# Patient Record
Sex: FEMALE | Race: WHITE | Employment: OTHER | URBAN - METROPOLITAN AREA
[De-identification: names, ages, dates, MRNs, and addresses within clinical notes are randomized per-mention and may not be internally consistent; named-entity substitution may affect disease eponyms.]

---

## 2017-03-15 ENCOUNTER — HOSPITAL ENCOUNTER (OUTPATIENT)
Age: 82
Discharge: HOME | End: 2017-03-15
Attending: FAMILY MEDICINE
Payer: MEDICARE

## 2017-03-15 VITALS
WEIGHT: 137 LBS | RESPIRATION RATE: 14 BRPM | OXYGEN SATURATION: 99 % | SYSTOLIC BLOOD PRESSURE: 140 MMHG | DIASTOLIC BLOOD PRESSURE: 70 MMHG | HEART RATE: 80 BPM | TEMPERATURE: 99 F

## 2017-03-15 DIAGNOSIS — R04.0 EPISTAXIS: Primary | ICD-10-CM

## 2017-03-15 PROCEDURE — 99212 OFFICE O/P EST SF 10 MIN: CPT

## 2017-03-15 RX ORDER — ASPIRIN 81 MG/1
81 TABLET ORAL DAILY
Status: ON HOLD | COMMUNITY
End: 2021-04-30

## 2017-03-15 NOTE — ED PROVIDER NOTES
Patient Seen in: [facility]    History   Patient presents with:  Cough/URI    Stated Complaint: NOSE PAIN    The history is provided by the patient.        Patient is an 80 y.o who presents with a 4-5 day h/o of nosebleeds She is oriented to person, place, and time. She appears well-developed and well-nourished.    HENT:   Right Ear: Tympanic membrane, external ear and ear canal normal.   Left Ear: Tympanic membrane, external ear and ear canal normal.   Nose: Rhinorrhea prese

## 2017-03-15 NOTE — ED INITIAL ASSESSMENT (HPI)
Sick for 4 days with nasal congestion. Frequent nosebleeds. No fever. No cough. Not sleeping well at night.

## 2017-07-27 ENCOUNTER — APPOINTMENT (OUTPATIENT)
Dept: GENERAL RADIOLOGY | Facility: HOSPITAL | Age: 82
End: 2017-07-27
Payer: MEDICARE

## 2017-07-27 ENCOUNTER — HOSPITAL ENCOUNTER (OUTPATIENT)
Facility: HOSPITAL | Age: 82
Setting detail: OBSERVATION
Discharge: HOME | End: 2017-07-27
Attending: EMERGENCY MEDICINE
Payer: MEDICARE

## 2017-07-27 ENCOUNTER — APPOINTMENT (OUTPATIENT)
Dept: INTERVENTIONAL RADIOLOGY/VASCULAR | Facility: HOSPITAL | Age: 82
End: 2017-07-27
Attending: INTERNAL MEDICINE
Payer: MEDICARE

## 2017-07-27 VITALS
BODY MASS INDEX: 26.87 KG/M2 | HEIGHT: 60 IN | SYSTOLIC BLOOD PRESSURE: 115 MMHG | TEMPERATURE: 97 F | HEART RATE: 63 BPM | WEIGHT: 136.88 LBS | RESPIRATION RATE: 18 BRPM | DIASTOLIC BLOOD PRESSURE: 49 MMHG | OXYGEN SATURATION: 94 %

## 2017-07-27 DIAGNOSIS — I21.3 ST ELEVATION MYOCARDIAL INFARCTION (STEMI), UNSPECIFIED ARTERY (HCC): Primary | ICD-10-CM

## 2017-07-27 LAB
ANION GAP SERPL CALC-SCNC: 6 MMOL/L (ref 0–18)
ANION GAP SERPL CALC-SCNC: 6 MMOL/L (ref 0–18)
APTT PPP: 25.9 SECONDS (ref 23.2–35.3)
APTT PPP: 26.3 SECONDS (ref 23.2–35.3)
BASOPHILS # BLD: 0.1 K/UL (ref 0–0.2)
BASOPHILS NFR BLD: 1 %
BUN SERPL-MCNC: 22 MG/DL (ref 8–20)
BUN SERPL-MCNC: 24 MG/DL (ref 8–20)
BUN/CREAT SERPL: 21.2 (ref 10–20)
BUN/CREAT SERPL: 21.8 (ref 10–20)
CALCIUM SERPL-MCNC: 8.4 MG/DL (ref 8.5–10.5)
CALCIUM SERPL-MCNC: 9 MG/DL (ref 8.5–10.5)
CHLORIDE SERPL-SCNC: 106 MMOL/L (ref 95–110)
CHLORIDE SERPL-SCNC: 108 MMOL/L (ref 95–110)
CO2 SERPL-SCNC: 24 MMOL/L (ref 22–32)
CO2 SERPL-SCNC: 24 MMOL/L (ref 22–32)
CREAT SERPL-MCNC: 1.04 MG/DL (ref 0.5–1.5)
CREAT SERPL-MCNC: 1.1 MG/DL (ref 0.5–1.5)
EOSINOPHIL # BLD: 0.4 K/UL (ref 0–0.7)
EOSINOPHIL NFR BLD: 4 %
ERYTHROCYTE [DISTWIDTH] IN BLOOD BY AUTOMATED COUNT: 17.2 % (ref 11–15)
ERYTHROCYTE [DISTWIDTH] IN BLOOD BY AUTOMATED COUNT: 17.2 % (ref 11–15)
GLUCOSE SERPL-MCNC: 119 MG/DL (ref 70–99)
GLUCOSE SERPL-MCNC: 99 MG/DL (ref 70–99)
HCT VFR BLD AUTO: 28.2 % (ref 35–48)
HCT VFR BLD AUTO: 31.1 % (ref 35–48)
HGB BLD-MCNC: 8.5 G/DL (ref 12–16)
HGB BLD-MCNC: 9.6 G/DL (ref 12–16)
LYMPHOCYTES # BLD: 2.2 K/UL (ref 1–4)
LYMPHOCYTES NFR BLD: 27 %
MCH RBC QN AUTO: 22 PG (ref 27–32)
MCH RBC QN AUTO: 22.4 PG (ref 27–32)
MCHC RBC AUTO-ENTMCNC: 30.3 G/DL (ref 32–37)
MCHC RBC AUTO-ENTMCNC: 30.9 G/DL (ref 32–37)
MCV RBC AUTO: 72.6 FL (ref 80–100)
MCV RBC AUTO: 72.6 FL (ref 80–100)
MONOCYTES # BLD: 0.7 K/UL (ref 0–1)
MONOCYTES NFR BLD: 9 %
NEUTROPHILS # BLD AUTO: 4.8 K/UL (ref 1.8–7.7)
NEUTROPHILS NFR BLD: 60 %
OSMOLALITY UR CALC.SUM OF ELEC: 285 MOSM/KG (ref 275–295)
OSMOLALITY UR CALC.SUM OF ELEC: 291 MOSM/KG (ref 275–295)
PLATELET # BLD AUTO: 247 K/UL (ref 140–400)
PLATELET # BLD AUTO: 290 K/UL (ref 140–400)
PMV BLD AUTO: 8.3 FL (ref 7.4–10.3)
PMV BLD AUTO: 8.5 FL (ref 7.4–10.3)
POTASSIUM SERPL-SCNC: 3.9 MMOL/L (ref 3.3–5.1)
POTASSIUM SERPL-SCNC: 4.1 MMOL/L (ref 3.3–5.1)
RBC # BLD AUTO: 3.88 M/UL (ref 3.7–5.4)
RBC # BLD AUTO: 4.28 M/UL (ref 3.7–5.4)
SODIUM SERPL-SCNC: 136 MMOL/L (ref 136–144)
SODIUM SERPL-SCNC: 138 MMOL/L (ref 136–144)
TROPONIN I SERPL-MCNC: 0.01 NG/ML (ref ?–0.03)
WBC # BLD AUTO: 7.5 K/UL (ref 4–11)
WBC # BLD AUTO: 8.1 K/UL (ref 4–11)

## 2017-07-27 PROCEDURE — B2151ZZ FLUOROSCOPY OF LEFT HEART USING LOW OSMOLAR CONTRAST: ICD-10-PCS | Performed by: INTERNAL MEDICINE

## 2017-07-27 PROCEDURE — B2111ZZ FLUOROSCOPY OF MULTIPLE CORONARY ARTERIES USING LOW OSMOLAR CONTRAST: ICD-10-PCS | Performed by: INTERNAL MEDICINE

## 2017-07-27 PROCEDURE — 71010 XR CHEST AP PORTABLE  (CPT=71010): CPT | Performed by: EMERGENCY MEDICINE

## 2017-07-27 PROCEDURE — 99236 HOSP IP/OBS SAME DATE HI 85: CPT | Performed by: HOSPITALIST

## 2017-07-27 PROCEDURE — 4A023N7 MEASUREMENT OF CARDIAC SAMPLING AND PRESSURE, LEFT HEART, PERCUTANEOUS APPROACH: ICD-10-PCS | Performed by: INTERNAL MEDICINE

## 2017-07-27 RX ORDER — SODIUM CHLORIDE 9 MG/ML
INJECTION, SOLUTION INTRAVENOUS CONTINUOUS
Status: DISCONTINUED | OUTPATIENT
Start: 2017-07-27 | End: 2017-07-27

## 2017-07-27 RX ORDER — HEPARIN SODIUM 1000 [USP'U]/ML
60 INJECTION, SOLUTION INTRAVENOUS; SUBCUTANEOUS ONCE
Status: COMPLETED | OUTPATIENT
Start: 2017-07-27 | End: 2017-07-27

## 2017-07-27 RX ORDER — METOPROLOL SUCCINATE 25 MG/1
25 TABLET, EXTENDED RELEASE ORAL DAILY
Status: DISCONTINUED | OUTPATIENT
Start: 2017-07-27 | End: 2017-07-27

## 2017-07-27 RX ORDER — LISINOPRIL 40 MG/1
40 TABLET ORAL DAILY
Status: DISCONTINUED | OUTPATIENT
Start: 2017-07-27 | End: 2017-07-27

## 2017-07-27 RX ORDER — LISINOPRIL 10 MG/1
5 TABLET ORAL DAILY
Status: DISCONTINUED | OUTPATIENT
Start: 2017-07-27 | End: 2017-07-27

## 2017-07-27 RX ORDER — PANTOPRAZOLE SODIUM 40 MG/1
40 TABLET, DELAYED RELEASE ORAL
Status: DISCONTINUED | OUTPATIENT
Start: 2017-07-27 | End: 2017-07-27

## 2017-07-27 RX ORDER — METOPROLOL SUCCINATE 25 MG/1
25 TABLET, EXTENDED RELEASE ORAL DAILY
COMMUNITY

## 2017-07-27 RX ORDER — MIDAZOLAM HYDROCHLORIDE 1 MG/ML
INJECTION INTRAMUSCULAR; INTRAVENOUS
Status: COMPLETED
Start: 2017-07-27 | End: 2017-07-27

## 2017-07-27 RX ORDER — LIDOCAINE HYDROCHLORIDE 20 MG/ML
INJECTION, SOLUTION EPIDURAL; INFILTRATION; INTRACAUDAL; PERINEURAL
Status: COMPLETED
Start: 2017-07-27 | End: 2017-07-27

## 2017-07-27 RX ORDER — UBIDECARENONE 75 MG
100 CAPSULE ORAL DAILY
Status: DISCONTINUED | OUTPATIENT
Start: 2017-07-27 | End: 2017-07-27

## 2017-07-27 RX ORDER — ATORVASTATIN CALCIUM 40 MG/1
40 TABLET, FILM COATED ORAL DAILY
COMMUNITY

## 2017-07-27 RX ORDER — SODIUM CHLORIDE 9 MG/ML
INJECTION, SOLUTION INTRAVENOUS
Status: COMPLETED
Start: 2017-07-27 | End: 2017-07-27

## 2017-07-27 RX ORDER — PANTOPRAZOLE SODIUM 40 MG/1
40 TABLET, DELAYED RELEASE ORAL
Qty: 30 TABLET | Refills: 0 | Status: ON HOLD | OUTPATIENT
Start: 2017-07-27 | End: 2019-02-27

## 2017-07-27 RX ORDER — CLOPIDOGREL BISULFATE 75 MG/1
75 TABLET ORAL DAILY
Status: DISCONTINUED | OUTPATIENT
Start: 2017-07-28 | End: 2017-07-27

## 2017-07-27 RX ORDER — HEPARIN SODIUM AND DEXTROSE 10000; 5 [USP'U]/100ML; G/100ML
12 INJECTION INTRAVENOUS ONCE
Status: COMPLETED | OUTPATIENT
Start: 2017-07-27 | End: 2017-07-27

## 2017-07-27 RX ORDER — CLOPIDOGREL BISULFATE 75 MG/1
75 TABLET ORAL DAILY
Status: DISCONTINUED | OUTPATIENT
Start: 2017-07-27 | End: 2017-07-27

## 2017-07-27 RX ORDER — ATORVASTATIN CALCIUM 40 MG/1
40 TABLET, FILM COATED ORAL NIGHTLY
Status: DISCONTINUED | OUTPATIENT
Start: 2017-07-27 | End: 2017-07-27

## 2017-07-27 RX ORDER — ASPIRIN 81 MG/1
81 TABLET ORAL DAILY
Status: DISCONTINUED | OUTPATIENT
Start: 2017-07-28 | End: 2017-07-27

## 2017-07-27 RX ORDER — ASPIRIN 81 MG/1
81 TABLET ORAL DAILY
Status: DISCONTINUED | OUTPATIENT
Start: 2017-07-27 | End: 2017-07-27

## 2017-07-27 RX ORDER — HEPARIN SODIUM AND DEXTROSE 10000; 5 [USP'U]/100ML; G/100ML
INJECTION INTRAVENOUS CONTINUOUS
Status: DISCONTINUED | OUTPATIENT
Start: 2017-07-27 | End: 2017-07-27

## 2017-07-27 NOTE — PLAN OF CARE
Problem: CARDIOVASCULAR - ADULT  Goal: Maintains optimal cardiac output and hemodynamic stability  INTERVENTIONS:  - Monitor vital signs, rhythm, and trends  - Monitor for bleeding, hypotension and signs of decreased cardiac output  - Evaluate effectivenes or patient's stated pain goal  INTERVENTIONS:  - Encourage pt to monitor pain and request assistance  - Assess pain using appropriate pain scale  - Administer analgesics based on type and severity of pain and evaluate response  - Implement non-pharmacologi

## 2017-07-27 NOTE — DISCHARGE SUMMARY
[facility]    Discharge Summary    Chino Daly Patient Status:  Observation    1933 MRN [id number]   Location [facility] 2W/SW Attending Lisa Burks MD   Hosp Day # 0 PCP Enrique Kay MD     Date of Admission: / Discharge Condition: Stable    Current Discharge Medication List    New Orders    Pantoprazole Sodium 40 MG Oral Tab EC  Take 1 tablet (40 mg total) by mouth every morning before breakfast.      Home Meds - Unchanged    Metoprolol Succinate ER 25 MG Oral rule out gastritis and evaluate iron deficiency anemia   Contact information:  [address]  [phone]                   Follow up Labs and imaging:   None       Time spent:  > 30 minutes    Nolan Murcia  7/27/2017

## 2017-07-27 NOTE — CONSULTS
[facility] AND [facility]  Report of Consultation    Dina Kowalski Patient Status:  Emergency    1933 MRN [id number]   Location [facility] Attending Alaina att. providers found   [address] Day # 0 PCP Ridge Foley MD     Reason for BS-present. Extremities: Without clubbing, cyanosis or edema. Peripheral pulses are 2+. Neurologic: Alert and oriented, normal affect. No motor or coordinational deficit. Skin: Warm and dry.        Laboratory Data:    Lab Results  Component Value Date

## 2017-07-27 NOTE — CONSULTS
LHC, LV, C done, no complications  LAD- Patent stent.  80% small septal , 80% small D2 in stented segment  Circ- 40-50% mid stenosis, 50% second OM  RCA- mild plaques  LVEF 53%    Rec- Check repeat troponin in am  Pantoprazole  Consider GI etiolog

## 2017-07-27 NOTE — H&P
[address] Patient Status:  Observation    1933 MRN [id number]   Location [facility] 2W/SW Attending Halina Burgos MD   Hosp Day # 0 PCP Everton Calero MD     Date:  2017  Date negative  Genitourinary:negative  Musculoskeletal:negative  Neurological: negative  Behavioral/Psych: negative  Endocrine: negative    Physical Exam:   Vital Signs:  Blood pressure 115/49, pulse 63, temperature (!) 97.4 °F (36.3 °C), temperature source Tem 8.4*   NA  138  136   K  3.9  4.1   CL  108  106   CO2  24  24       Xr Chest Ap Portable  (cpt=71010)    Result Date: 7/27/2017  CONCLUSION: 1. Little change in February 21, 2016. 2. Cardiomegaly. 3. Atherosclerosis. 4. Scarring/atelectasis.  5. Kirk Asa

## 2017-07-27 NOTE — PROGRESS NOTES
Cardiology. Chart reviewed  Cath films reviewed  Doing well.  Pain resolved  Non cardiac chest pain  Ok to transfer/dc form cv standpoint  Groin ok  D/w pt and family; ? answwered

## 2017-07-27 NOTE — FACE TO FACE NOTE
17:30 - Patient discharged home accompanied by daughter. Discharge instructions, prescription for protonix and personal belongings with the patient on discharge home.

## 2018-06-24 ENCOUNTER — APPOINTMENT (OUTPATIENT)
Dept: GENERAL RADIOLOGY | Facility: HOSPITAL | Age: 83
End: 2018-06-24
Attending: EMERGENCY MEDICINE
Payer: MEDICARE

## 2018-06-24 ENCOUNTER — HOSPITAL ENCOUNTER (EMERGENCY)
Facility: HOSPITAL | Age: 83
Discharge: HOME | End: 2018-06-24
Attending: EMERGENCY MEDICINE
Payer: MEDICARE

## 2018-06-24 VITALS
SYSTOLIC BLOOD PRESSURE: 169 MMHG | HEIGHT: 61 IN | DIASTOLIC BLOOD PRESSURE: 66 MMHG | TEMPERATURE: 98 F | RESPIRATION RATE: 15 BRPM | HEART RATE: 54 BPM | BODY MASS INDEX: 26.06 KG/M2 | OXYGEN SATURATION: 97 % | WEIGHT: 138 LBS

## 2018-06-24 DIAGNOSIS — J06.9 VIRAL UPPER RESPIRATORY TRACT INFECTION: Primary | ICD-10-CM

## 2018-06-24 DIAGNOSIS — R07.89 CHEST PAIN, ATYPICAL: ICD-10-CM

## 2018-06-24 PROCEDURE — 94640 AIRWAY INHALATION TREATMENT: CPT

## 2018-06-24 PROCEDURE — 71045 X-RAY EXAM CHEST 1 VIEW: CPT | Performed by: EMERGENCY MEDICINE

## 2018-06-24 PROCEDURE — 96360 HYDRATION IV INFUSION INIT: CPT

## 2018-06-24 PROCEDURE — 84484 ASSAY OF TROPONIN QUANT: CPT | Performed by: EMERGENCY MEDICINE

## 2018-06-24 PROCEDURE — 93005 ELECTROCARDIOGRAM TRACING: CPT

## 2018-06-24 PROCEDURE — 96361 HYDRATE IV INFUSION ADD-ON: CPT

## 2018-06-24 PROCEDURE — 85025 COMPLETE CBC W/AUTO DIFF WBC: CPT | Performed by: EMERGENCY MEDICINE

## 2018-06-24 PROCEDURE — 93010 ELECTROCARDIOGRAM REPORT: CPT | Performed by: EMERGENCY MEDICINE

## 2018-06-24 PROCEDURE — 80048 BASIC METABOLIC PNL TOTAL CA: CPT | Performed by: EMERGENCY MEDICINE

## 2018-06-24 PROCEDURE — 80076 HEPATIC FUNCTION PANEL: CPT | Performed by: EMERGENCY MEDICINE

## 2018-06-24 PROCEDURE — 99285 EMERGENCY DEPT VISIT HI MDM: CPT

## 2018-06-24 RX ORDER — ALBUTEROL SULFATE 90 UG/1
2 AEROSOL, METERED RESPIRATORY (INHALATION) EVERY 4 HOURS PRN
Qty: 1 INHALER | Refills: 0 | Status: SHIPPED | OUTPATIENT
Start: 2018-06-24 | End: 2018-07-24

## 2018-06-24 RX ORDER — ALBUTEROL SULFATE 2.5 MG/3ML
2.5 SOLUTION RESPIRATORY (INHALATION) ONCE
Status: COMPLETED | OUTPATIENT
Start: 2018-06-24 | End: 2018-06-24

## 2018-06-24 RX ORDER — SODIUM CHLORIDE 9 MG/ML
INJECTION, SOLUTION INTRAVENOUS ONCE
Status: COMPLETED | OUTPATIENT
Start: 2018-06-24 | End: 2018-06-24

## 2018-06-24 NOTE — ED INITIAL ASSESSMENT (HPI)
Care assumed from ems. Pt c/o cough x2 days and left sided chest pain since last night. Pt denies pain down bl arms, jaw pain, back pain, sob/dyspnea, n/v/d, headache, dizziness. Pt is aox4.  Pts work of breathing is easy and unlabored, skin color is approp

## 2018-06-24 NOTE — ED PROVIDER NOTES
Patient Seen in: [facility] AND [facility] Emergency Department    History   Patient presents with:  Chest Pain Angina (cardiovascular)  Cough/URI    Stated Complaint:     HPI    The patient is an 29-year-old female who presents with left-sided chest pain and pr stridor   Eyes: Conjunctivae and EOM are normal. Pupils are equal, round, and reactive to light. Neck: Normal range of motion. Neck supple. No JVD present. Cardiovascular: Normal rate, regular rhythm, normal heart sounds and intact distal pulses.     No RAINBOW DRAW BLUE   RAINBOW DRAW LAVENDER   RAINBOW DRAW DARK GREEN   RAINBOW DRAW LIGHT GREEN   RAINBOW DRAW GOLD   RAINBOW DRAW LAVENDER TALL (BNP)     EKG    Rate, intervals and axes as noted on EKG Report.   Rate: 59  Rhythm: Sinus Rhythm  Reading: Ri lungs every 4 (four) hours as needed for Wheezing.   Qty: 1 Inhaler Refills: 0

## 2019-02-27 ENCOUNTER — APPOINTMENT (OUTPATIENT)
Dept: GENERAL RADIOLOGY | Facility: HOSPITAL | Age: 84
End: 2019-02-27
Attending: EMERGENCY MEDICINE
Payer: MEDICARE

## 2019-02-27 ENCOUNTER — HOSPITAL ENCOUNTER (OUTPATIENT)
Facility: HOSPITAL | Age: 84
Setting detail: OBSERVATION
Discharge: HOME | End: 2019-02-28
Attending: EMERGENCY MEDICINE | Admitting: HOSPITALIST
Payer: MEDICARE

## 2019-02-27 DIAGNOSIS — R07.9 ACUTE CHEST PAIN: Primary | ICD-10-CM

## 2019-02-27 LAB
ANION GAP SERPL CALC-SCNC: 8 MMOL/L (ref 0–18)
BASOPHILS # BLD AUTO: 0.04 X10(3) UL (ref 0–0.2)
BASOPHILS NFR BLD AUTO: 0.5 %
BUN BLD-MCNC: 21 MG/DL (ref 7–18)
BUN/CREAT SERPL: 19.4 (ref 10–20)
CALCIUM BLD-MCNC: 9.1 MG/DL (ref 8.5–10.1)
CHLORIDE SERPL-SCNC: 105 MMOL/L (ref 98–107)
CO2 SERPL-SCNC: 27 MMOL/L (ref 21–32)
CREAT BLD-MCNC: 1.08 MG/DL (ref 0.55–1.02)
D DIMER PPP FEU-MCNC: 0.5 MCG/ML (ref ?–0.85)
DEPRECATED RDW RBC AUTO: 46.4 FL (ref 35.1–46.3)
EOSINOPHIL # BLD AUTO: 0.22 X10(3) UL (ref 0–0.7)
EOSINOPHIL NFR BLD AUTO: 3 %
ERYTHROCYTE [DISTWIDTH] IN BLOOD BY AUTOMATED COUNT: 13.3 % (ref 11–15)
GLUCOSE BLD-MCNC: 157 MG/DL (ref 70–99)
HCT VFR BLD AUTO: 40.1 % (ref 35–48)
HGB BLD-MCNC: 13.7 G/DL (ref 12–16)
IMM GRANULOCYTES # BLD AUTO: 0.02 X10(3) UL (ref 0–1)
IMM GRANULOCYTES NFR BLD: 0.3 %
LYMPHOCYTES # BLD AUTO: 1.72 X10(3) UL (ref 1–4)
LYMPHOCYTES NFR BLD AUTO: 23.3 %
MCH RBC QN AUTO: 32.5 PG (ref 26–34)
MCHC RBC AUTO-ENTMCNC: 34.2 G/DL (ref 31–37)
MCV RBC AUTO: 95.2 FL (ref 80–100)
MONOCYTES # BLD AUTO: 0.51 X10(3) UL (ref 0.1–1)
MONOCYTES NFR BLD AUTO: 6.9 %
NEUTROPHILS # BLD AUTO: 4.88 X10 (3) UL (ref 1.5–7.7)
NEUTROPHILS # BLD AUTO: 4.88 X10(3) UL (ref 1.5–7.7)
NEUTROPHILS NFR BLD AUTO: 66 %
OSMOLALITY SERPL CALC.SUM OF ELEC: 296 MOSM/KG (ref 275–295)
PLATELET # BLD AUTO: 213 10(3)UL (ref 150–450)
POTASSIUM SERPL-SCNC: 4 MMOL/L (ref 3.5–5.1)
RBC # BLD AUTO: 4.21 X10(6)UL (ref 3.8–5.3)
SODIUM SERPL-SCNC: 140 MMOL/L (ref 136–145)
TROPONIN I SERPL-MCNC: <0.045 NG/ML (ref ?–0.04)
TROPONIN I SERPL-MCNC: <0.045 NG/ML (ref ?–0.04)
WBC # BLD AUTO: 7.4 X10(3) UL (ref 4–11)

## 2019-02-27 PROCEDURE — 71046 X-RAY EXAM CHEST 2 VIEWS: CPT | Performed by: EMERGENCY MEDICINE

## 2019-02-27 PROCEDURE — 99220 INITIAL OBSERVATION CARE,LEVL III: CPT | Performed by: HOSPITALIST

## 2019-02-27 RX ORDER — HEPARIN SODIUM 5000 [USP'U]/ML
5000 INJECTION, SOLUTION INTRAVENOUS; SUBCUTANEOUS EVERY 12 HOURS SCHEDULED
Status: DISCONTINUED | OUTPATIENT
Start: 2019-02-27 | End: 2019-02-28

## 2019-02-27 RX ORDER — ONDANSETRON 2 MG/ML
4 INJECTION INTRAMUSCULAR; INTRAVENOUS EVERY 6 HOURS PRN
Status: DISCONTINUED | OUTPATIENT
Start: 2019-02-27 | End: 2019-02-28

## 2019-02-27 RX ORDER — RANITIDINE 150 MG/1
150 CAPSULE ORAL DAILY
Status: ON HOLD | COMMUNITY
End: 2021-04-29

## 2019-02-27 RX ORDER — ASPIRIN 325 MG
325 TABLET ORAL DAILY
Status: DISCONTINUED | OUTPATIENT
Start: 2019-02-28 | End: 2019-02-28

## 2019-02-27 RX ORDER — SODIUM CHLORIDE 0.9 % (FLUSH) 0.9 %
3 SYRINGE (ML) INJECTION AS NEEDED
Status: DISCONTINUED | OUTPATIENT
Start: 2019-02-27 | End: 2019-02-28

## 2019-02-27 RX ORDER — NITROGLYCERIN 0.4 MG/1
0.4 TABLET SUBLINGUAL EVERY 5 MIN PRN
Status: DISCONTINUED | OUTPATIENT
Start: 2019-02-27 | End: 2019-02-28

## 2019-02-27 RX ORDER — ATORVASTATIN CALCIUM 40 MG/1
40 TABLET, FILM COATED ORAL DAILY
Status: DISCONTINUED | OUTPATIENT
Start: 2019-02-28 | End: 2019-02-28

## 2019-02-27 RX ORDER — FAMOTIDINE 20 MG/1
20 TABLET ORAL DAILY
Status: DISCONTINUED | OUTPATIENT
Start: 2019-02-28 | End: 2019-02-28

## 2019-02-27 RX ORDER — METOPROLOL SUCCINATE 25 MG/1
25 TABLET, EXTENDED RELEASE ORAL DAILY
Status: DISCONTINUED | OUTPATIENT
Start: 2019-02-28 | End: 2019-02-28

## 2019-02-27 RX ORDER — NITROGLYCERIN 0.4 MG/1
0.4 TABLET SUBLINGUAL ONCE
Status: COMPLETED | OUTPATIENT
Start: 2019-02-27 | End: 2019-02-27

## 2019-02-27 RX ORDER — ACETAMINOPHEN 325 MG/1
650 TABLET ORAL EVERY 6 HOURS PRN
Status: DISCONTINUED | OUTPATIENT
Start: 2019-02-27 | End: 2019-02-28

## 2019-02-27 RX ORDER — UBIDECARENONE 75 MG
100 CAPSULE ORAL DAILY
Status: DISCONTINUED | OUTPATIENT
Start: 2019-02-28 | End: 2019-02-28

## 2019-02-27 RX ORDER — LISINOPRIL 40 MG/1
40 TABLET ORAL DAILY
Status: DISCONTINUED | OUTPATIENT
Start: 2019-02-28 | End: 2019-02-28

## 2019-02-27 NOTE — ED INITIAL ASSESSMENT (HPI)
C/o mid-sternal chest discomfort at 0830 this morning, \"like something was grabbing me. \" Pain subsided without intervention approx 1 hour later. Her BP reading at home was as high as 206/106. C/o some mild chest discomfort at time of triage.  Denies dyspn

## 2019-02-27 NOTE — ED PROVIDER NOTES
Patient Seen in: [facility] AND [facility] Emergency Department    History   Patient presents with:  Chest Pain Angina (cardiovascular)    Stated Complaint: Hypertension     HPI    80year old female with h/o HTN, high cholesterol, CAD s/p stent placed 2016 at L kg   SpO2 95%   BMI 27.42 kg/m²         Physical Exam   Constitutional: She is oriented to person, place, and time. She appears well-developed and well-nourished. She is cooperative. Non-toxic appearance. No distress.    HENT:   Head: Normocephalic and atr Status                     ---------                               -----------         ------                     CBC W/ DIFFERENTIAL[[id number]]          Abnormal            Final result                 Please view results for these tests on the individual diagnosis)    Disposition:  Admit  2/27/2019  7:22 pm    Follow-up:  No follow-up provider specified.   We recommend that you schedule follow up care with a primary care provider within the next three months to obtain basic health screening including reasse

## 2019-02-28 ENCOUNTER — APPOINTMENT (OUTPATIENT)
Dept: NUCLEAR MEDICINE | Facility: HOSPITAL | Age: 84
End: 2019-02-28
Attending: HOSPITALIST
Payer: MEDICARE

## 2019-02-28 ENCOUNTER — APPOINTMENT (OUTPATIENT)
Dept: CV DIAGNOSTICS | Facility: HOSPITAL | Age: 84
End: 2019-02-28
Attending: HOSPITALIST
Payer: MEDICARE

## 2019-02-28 VITALS
TEMPERATURE: 98 F | RESPIRATION RATE: 18 BRPM | WEIGHT: 138.69 LBS | OXYGEN SATURATION: 97 % | DIASTOLIC BLOOD PRESSURE: 79 MMHG | SYSTOLIC BLOOD PRESSURE: 132 MMHG | HEIGHT: 60 IN | BODY MASS INDEX: 27.23 KG/M2 | HEART RATE: 83 BPM

## 2019-02-28 LAB
ANION GAP SERPL CALC-SCNC: 8 MMOL/L (ref 0–18)
BASOPHILS # BLD AUTO: 0.04 X10(3) UL (ref 0–0.2)
BASOPHILS NFR BLD AUTO: 0.6 %
BUN BLD-MCNC: 22 MG/DL (ref 7–18)
BUN/CREAT SERPL: 22.7 (ref 10–20)
CALCIUM BLD-MCNC: 8.4 MG/DL (ref 8.5–10.1)
CHLORIDE SERPL-SCNC: 109 MMOL/L (ref 98–107)
CHOLEST SMN-MCNC: 126 MG/DL (ref ?–200)
CO2 SERPL-SCNC: 26 MMOL/L (ref 21–32)
CREAT BLD-MCNC: 0.97 MG/DL (ref 0.55–1.02)
DEPRECATED RDW RBC AUTO: 48.3 FL (ref 35.1–46.3)
EOSINOPHIL # BLD AUTO: 0.31 X10(3) UL (ref 0–0.7)
EOSINOPHIL NFR BLD AUTO: 4.9 %
ERYTHROCYTE [DISTWIDTH] IN BLOOD BY AUTOMATED COUNT: 13.4 % (ref 11–15)
GLUCOSE BLD-MCNC: 88 MG/DL (ref 70–99)
HCT VFR BLD AUTO: 38.4 % (ref 35–48)
HDLC SERPL-MCNC: 38 MG/DL (ref 40–59)
HGB BLD-MCNC: 12.6 G/DL (ref 12–16)
IMM GRANULOCYTES # BLD AUTO: 0.01 X10(3) UL (ref 0–1)
IMM GRANULOCYTES NFR BLD: 0.2 %
LDLC SERPL CALC-MCNC: 55 MG/DL (ref ?–100)
LYMPHOCYTES # BLD AUTO: 1.62 X10(3) UL (ref 1–4)
LYMPHOCYTES NFR BLD AUTO: 25.7 %
MCH RBC QN AUTO: 32 PG (ref 26–34)
MCHC RBC AUTO-ENTMCNC: 32.8 G/DL (ref 31–37)
MCV RBC AUTO: 97.5 FL (ref 80–100)
MONOCYTES # BLD AUTO: 0.66 X10(3) UL (ref 0.1–1)
MONOCYTES NFR BLD AUTO: 10.5 %
NEUTROPHILS # BLD AUTO: 3.66 X10 (3) UL (ref 1.5–7.7)
NEUTROPHILS # BLD AUTO: 3.66 X10(3) UL (ref 1.5–7.7)
NEUTROPHILS NFR BLD AUTO: 58.1 %
NONHDLC SERPL-MCNC: 88 MG/DL (ref ?–130)
OSMOLALITY SERPL CALC.SUM OF ELEC: 299 MOSM/KG (ref 275–295)
PLATELET # BLD AUTO: 189 10(3)UL (ref 150–450)
POTASSIUM SERPL-SCNC: 4.3 MMOL/L (ref 3.5–5.1)
RBC # BLD AUTO: 3.94 X10(6)UL (ref 3.8–5.3)
SODIUM SERPL-SCNC: 143 MMOL/L (ref 136–145)
TRIGL SERPL-MCNC: 165 MG/DL (ref 30–149)
TROPONIN I SERPL-MCNC: <0.045 NG/ML (ref ?–0.04)
VLDLC SERPL CALC-MCNC: 33 MG/DL (ref 0–30)
WBC # BLD AUTO: 6.3 X10(3) UL (ref 4–11)

## 2019-02-28 PROCEDURE — 78452 HT MUSCLE IMAGE SPECT MULT: CPT | Performed by: HOSPITALIST

## 2019-02-28 PROCEDURE — 93017 CV STRESS TEST TRACING ONLY: CPT | Performed by: HOSPITALIST

## 2019-02-28 RX ORDER — 0.9 % SODIUM CHLORIDE 0.9 %
VIAL (ML) INJECTION
Status: COMPLETED
Start: 2019-02-28 | End: 2019-02-28

## 2019-02-28 NOTE — DISCHARGE SUMMARY
[facility]    Discharge Summary    Ranjanjunaid Dunnedo Patient Status:  Observation    1933 MRN [id number]   Location [facility]5W Attending No att. providers found   Hosp Day # 0 PCP Raymon Saul MD     Date of Admission: 2 Anna    Procedures: n/a    Complications: n/a    Discharge Condition: Good    Discharge Medications:      Discharge Medications      CONTINUE taking these medications      Instructions Prescription details   aspirin 81 MG Tbec      Take 81 mg by mouth

## 2019-02-28 NOTE — H&P
[facility]    PATIENT'S NAME: Tre Streeter   ATTENDING PHYSICIAN: Nayan Su MD   PATIENT ACCOUNT#:   [id number]    LOCATION:  [place] [address]  MEDICAL RECORD #:   [id number]       YOB: 1933  ADMISSION DATE:       02/27/2 more dull. Eventually, paramedics were called around 5 p.m., and she was given nitroglycerin which she claims improved her discomfort. The patient denies any radiation to the jaw or neck. No diaphoresis.   Pain has no clear correlation to physical [place] 08:34:48  Job [id number]  FB/

## 2019-02-28 NOTE — PLAN OF CARE
Problem: Patient Centered Care  Goal: Patient preferences are identified and integrated in the patient's plan of care  Interventions:  - What would you like us to know as we care for you?  \"I live with my , he has early Alzheimer's so I take care of

## 2019-02-28 NOTE — CONSULTS
[facility]    PATIENT'S NAME: Marian Nick   ATTENDING PHYSICIAN: Marielos Quevedo MD   CONSULTING PHYSICIAN: Mariza Zapata MD   PATIENT ACCOUNT#:   [id number]    LOCATION:  [address] [place] [address] #:   [id number]       DATE OF BIRTH discomfort. There is no shortness of breath or cough. She denies any abdominal pain, melena, or hematuria. There is a history of stroke. There is no history of diabetes. No new allergies. There is history of arthritis. There is no easy bruising. 2017.  6.   We will discuss with Dr. Shadi Yu. Dictated By [address] [place] [address].  Rosemary Charles MD  d: 02/28/2019 10:10:29  t: 02/28/2019 10:57:52  Job [id number]  BY/

## 2019-02-28 NOTE — PLAN OF CARE
CARDIOVASCULAR - ADULT    • Maintains optimal cardiac output and hemodynamic stability Progressing    • Absence of cardiac arrhythmias or at baseline Progressing        Received awake,oriented x 4. Troponins x 3 negative.  Plans for stress test in am. Pt. d

## 2019-05-06 ENCOUNTER — HOSPITAL ENCOUNTER (OUTPATIENT)
Dept: ULTRASOUND IMAGING | Facility: HOSPITAL | Age: 84
Discharge: HOME | End: 2019-05-06
Attending: INTERNAL MEDICINE
Payer: MEDICARE

## 2019-05-06 DIAGNOSIS — N30.00 ACUTE CYSTITIS: ICD-10-CM

## 2019-05-06 PROCEDURE — 76775 US EXAM ABDO BACK WALL LIM: CPT | Performed by: INTERNAL MEDICINE

## 2019-05-09 ENCOUNTER — APPOINTMENT (OUTPATIENT)
Dept: CT IMAGING | Facility: HOSPITAL | Age: 84
End: 2019-05-09
Attending: EMERGENCY MEDICINE
Payer: MEDICARE

## 2019-05-09 ENCOUNTER — APPOINTMENT (OUTPATIENT)
Dept: GENERAL RADIOLOGY | Facility: HOSPITAL | Age: 84
End: 2019-05-09
Payer: MEDICARE

## 2019-05-09 ENCOUNTER — HOSPITAL ENCOUNTER (OUTPATIENT)
Facility: HOSPITAL | Age: 84
Setting detail: OBSERVATION
Discharge: HOME | End: 2019-05-10
Attending: EMERGENCY MEDICINE | Admitting: HOSPITALIST
Payer: MEDICARE

## 2019-05-09 DIAGNOSIS — R50.9 ACUTE FEBRILE ILLNESS: Primary | ICD-10-CM

## 2019-05-09 DIAGNOSIS — R09.02 HYPOXIA: ICD-10-CM

## 2019-05-09 PROCEDURE — 99220 INITIAL OBSERVATION CARE,LEVL III: CPT | Performed by: HOSPITALIST

## 2019-05-09 PROCEDURE — 71045 X-RAY EXAM CHEST 1 VIEW: CPT | Performed by: EMERGENCY MEDICINE

## 2019-05-09 PROCEDURE — 74177 CT ABD & PELVIS W/CONTRAST: CPT | Performed by: EMERGENCY MEDICINE

## 2019-05-09 RX ORDER — POLYETHYLENE GLYCOL 3350 17 G/17G
17 POWDER, FOR SOLUTION ORAL DAILY PRN
Status: DISCONTINUED | OUTPATIENT
Start: 2019-05-09 | End: 2019-05-10

## 2019-05-09 RX ORDER — NITROFURANTOIN 25; 75 MG/1; MG/1
100 CAPSULE ORAL 2 TIMES DAILY
Status: DISCONTINUED | OUTPATIENT
Start: 2019-05-09 | End: 2019-05-09

## 2019-05-09 RX ORDER — IPRATROPIUM BROMIDE AND ALBUTEROL SULFATE 2.5; .5 MG/3ML; MG/3ML
3 SOLUTION RESPIRATORY (INHALATION) ONCE
Status: COMPLETED | OUTPATIENT
Start: 2019-05-09 | End: 2019-05-09

## 2019-05-09 RX ORDER — AMLODIPINE BESYLATE 2.5 MG/1
2.5 TABLET ORAL
Status: ON HOLD | COMMUNITY
End: 2021-04-30

## 2019-05-09 RX ORDER — NITROFURANTOIN MACROCRYSTALS 100 MG/1
100 CAPSULE ORAL 2 TIMES DAILY
Refills: 1 | Status: ON HOLD | COMMUNITY
Start: 2019-05-07 | End: 2019-05-10

## 2019-05-09 RX ORDER — ONDANSETRON 2 MG/ML
4 INJECTION INTRAMUSCULAR; INTRAVENOUS EVERY 6 HOURS PRN
Status: DISCONTINUED | OUTPATIENT
Start: 2019-05-09 | End: 2019-05-10

## 2019-05-09 RX ORDER — ACETAMINOPHEN 325 MG/1
650 TABLET ORAL EVERY 6 HOURS PRN
Status: DISCONTINUED | OUTPATIENT
Start: 2019-05-09 | End: 2019-05-10

## 2019-05-09 RX ORDER — METOPROLOL SUCCINATE 25 MG/1
25 TABLET, EXTENDED RELEASE ORAL DAILY
Status: DISCONTINUED | OUTPATIENT
Start: 2019-05-09 | End: 2019-05-10

## 2019-05-09 RX ORDER — AMLODIPINE BESYLATE 2.5 MG/1
2.5 TABLET ORAL DAILY
Status: DISCONTINUED | OUTPATIENT
Start: 2019-05-09 | End: 2019-05-10

## 2019-05-09 RX ORDER — FAMOTIDINE 20 MG/1
20 TABLET ORAL DAILY
Status: DISCONTINUED | OUTPATIENT
Start: 2019-05-09 | End: 2019-05-10

## 2019-05-09 RX ORDER — IPRATROPIUM BROMIDE AND ALBUTEROL SULFATE 2.5; .5 MG/3ML; MG/3ML
3 SOLUTION RESPIRATORY (INHALATION) EVERY 4 HOURS PRN
Status: DISCONTINUED | OUTPATIENT
Start: 2019-05-09 | End: 2019-05-10

## 2019-05-09 RX ORDER — SODIUM CHLORIDE 0.9 % (FLUSH) 0.9 %
3 SYRINGE (ML) INJECTION AS NEEDED
Status: DISCONTINUED | OUTPATIENT
Start: 2019-05-09 | End: 2019-05-10

## 2019-05-09 RX ORDER — ATORVASTATIN CALCIUM 40 MG/1
40 TABLET, FILM COATED ORAL DAILY
Status: DISCONTINUED | OUTPATIENT
Start: 2019-05-09 | End: 2019-05-10

## 2019-05-09 RX ORDER — ONDANSETRON 2 MG/ML
4 INJECTION INTRAMUSCULAR; INTRAVENOUS ONCE
Status: COMPLETED | OUTPATIENT
Start: 2019-05-09 | End: 2019-05-09

## 2019-05-09 RX ORDER — SODIUM CHLORIDE 9 MG/ML
INJECTION, SOLUTION INTRAVENOUS CONTINUOUS
Status: DISCONTINUED | OUTPATIENT
Start: 2019-05-09 | End: 2019-05-10

## 2019-05-09 RX ORDER — BISACODYL 10 MG
10 SUPPOSITORY, RECTAL RECTAL
Status: DISCONTINUED | OUTPATIENT
Start: 2019-05-09 | End: 2019-05-10

## 2019-05-09 RX ORDER — ASPIRIN 81 MG/1
81 TABLET ORAL DAILY
Status: DISCONTINUED | OUTPATIENT
Start: 2019-05-09 | End: 2019-05-10

## 2019-05-09 RX ORDER — ACETAMINOPHEN 500 MG
1000 TABLET ORAL ONCE
Status: COMPLETED | OUTPATIENT
Start: 2019-05-09 | End: 2019-05-09

## 2019-05-09 RX ORDER — BENZONATATE 100 MG/1
100 CAPSULE ORAL 3 TIMES DAILY PRN
Status: DISCONTINUED | OUTPATIENT
Start: 2019-05-09 | End: 2019-05-10

## 2019-05-09 RX ORDER — ENOXAPARIN SODIUM 100 MG/ML
30 INJECTION SUBCUTANEOUS DAILY
Status: DISCONTINUED | OUTPATIENT
Start: 2019-05-09 | End: 2019-05-10

## 2019-05-09 NOTE — ED INITIAL ASSESSMENT (HPI)
Cough, fever & fatigue since Tuesday. 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 Currently on abx for kidney infection.  Denies nancy or cp

## 2019-05-09 NOTE — ED NOTES
Pt c/o congested cough x3days and fever and nausea this am. Pt denies hx of COPD, sob. Pts work of breathing is easy and unlabored, skin color is appropriate, able to speak in full sentences. No medication taken pta for fever. Pt denies cp.  Pt also mention

## 2019-05-09 NOTE — ED NOTES
Pt aware of need for urine sample, states that she is unable to urinate at this time. Will continue to monitor.

## 2019-05-09 NOTE — ED NOTES
Orders for admission, patient is aware of plan and ready to go upstairs. Any questions, please call ED RN Adonay Adhikari  at extension [phone]. Pt with family at , on 02 2L for hypoxia while asleep. Denies other needs. Will cont. To monitor.

## 2019-05-09 NOTE — ED NOTES
Pts o2 sat decreased to 88% on room air. 2l o2 nc applied, sats increased to 95%. Will continue to monitor.

## 2019-05-09 NOTE — ED NOTES
Pt feels better after neb, attempting to urinate. Pt found drinking in room, will keep npo from now on until ct back. Pt attempting to urinate. Will cont. To monitor.

## 2019-05-09 NOTE — ED NOTES
Pt to ct at this time, o2 sat 92% RA, will hold on o2 at this time. Pt provided urine, will cont. To monitor.

## 2019-05-09 NOTE — H&P
[address] Patient Status:  Observation    1933 MRN [id number]   Location [facility] 5SW/SE Attending Jadiel Diaz DO   Hosp Day # 0 PCP Bhavesh Hurt MD     Date:  2021  Da UNKNOWN  Tetracycline              Clindamycin             RASH  Latex                   RASH    Medications Prior to Admission:  amLODIPine Besylate 2.5 MG Oral Tab Take 2.5 mg by mouth daily. raNITIdine HCl 150 MG Oral Cap Take 150 mg by mouth daily. thyroid not enlarged, symmetric, no tenderness/mass/nodules  Back: symmetric, no curvature. ROM normal. No CVA tenderness.   Pulmonary:  clear to auscultation bilaterally  Cardiovascular: S1, S2 normal, no murmur, click, rub or gallop, regular rate and rhyt (cpt=71045)    Result Date: 5/9/2019  CONCLUSION:  1. No acute cardiopulmonary abnormality. No significant change since prior exam from 2/27/19. 2.  Moderate-sized hiatal hernia. 3.  Cardiomegaly.   Dictated by (CST): Nabila Erazo MD on 5/09/2019 at 6:46

## 2019-05-09 NOTE — PLAN OF CARE
Problem: Patient Centered Care  Goal: Patient preferences are identified and integrated in the patient's plan of care  Description  Interventions:  - What would you like us to know as we care for you?  I live at home with my   - Provide timely, com

## 2019-05-09 NOTE — ED PROVIDER NOTES
Patient Seen in: [facility] AND [facility] Emergency Department    History   Patient presents with:  Cough/URI    Stated Complaint: cough    HPI    Pt is 81 yo F who arrives from home with family for cough x 4 days. +trouble breathing and nausea. +fever today.  Selma Moreiras distension  Extremities: FROM of all extremities, no cyanosis/clubbing/edema  Neuro: CN intact, normal speech. 5/5 motor strength in all extremities, no focal deficits  SKIN: warm, dry, no rashes.  Sutures in right arm clean/dry/intact        ED Course urinary bladder which projects 3 cm below the pelvic floor suggesting prolapse. 4.  Peripelvic cysts within the left kidney. 5.  Colonic diverticulosis without acute inflammation.   Dictated by (CST): Jerrica Crouch MD on 5/09/2019 at 8:15     Approved by (CS reassessment of your blood pressure.     Medications Prescribed:  Current Discharge Medication List        Present on Admission           ICD-10-CM Noted POA    Acute febrile illness R50.9 5/9/2019 Unknown

## 2019-05-09 NOTE — PROGRESS NOTES
[facility] Pharmacy Note:  Renal Dose Adjustment for Enoxaparin (LOVENOX)    Ramonita Stevens has been prescribed Enoxaparin (LOVENOX) 40 mg subcutaneously every 24 hours. Estimated Creatinine Clearance: 26.6 mL/min (A) (based on SCr of 1.11 mg/dL (H)).     Her

## 2019-05-09 NOTE — PROGRESS NOTES
[facility] Pharmacy Consult Note:  Antibiotic Dosing    Pharmacy was consulted to dose Ceftriaxone (Rocephin) for treatment of UTI by Dr. Anil Moon. Body mass index is 26.95 kg/m². Wt Readings from Last 6 Encounters:  05/09/19 : 62.6 kg (138 lb)  02/28/19 : 62. 9

## 2019-05-10 ENCOUNTER — APPOINTMENT (OUTPATIENT)
Dept: GENERAL RADIOLOGY | Facility: HOSPITAL | Age: 84
End: 2019-05-10
Attending: HOSPITALIST
Payer: MEDICARE

## 2019-05-10 VITALS
RESPIRATION RATE: 20 BRPM | SYSTOLIC BLOOD PRESSURE: 139 MMHG | DIASTOLIC BLOOD PRESSURE: 65 MMHG | HEIGHT: 60 IN | HEART RATE: 71 BPM | BODY MASS INDEX: 28.31 KG/M2 | OXYGEN SATURATION: 94 % | TEMPERATURE: 99 F | WEIGHT: 144.19 LBS

## 2019-05-10 PROCEDURE — 71046 X-RAY EXAM CHEST 2 VIEWS: CPT | Performed by: HOSPITALIST

## 2019-05-10 PROCEDURE — 99217 OBSERVATION CARE DISCHARGE: CPT | Performed by: HOSPITALIST

## 2019-05-10 RX ORDER — GUAIFENESIN 100 MG/5ML
100 SOLUTION ORAL EVERY 4 HOURS PRN
Qty: 118 ML | Refills: 0 | Status: ON HOLD | OUTPATIENT
Start: 2019-05-10 | End: 2021-04-29 | Stop reason: ALTCHOICE

## 2019-05-10 RX ORDER — BENZONATATE 100 MG/1
100 CAPSULE ORAL 3 TIMES DAILY PRN
Qty: 30 CAPSULE | Refills: 0 | Status: ON HOLD | OUTPATIENT
Start: 2019-05-10 | End: 2021-04-29 | Stop reason: ALTCHOICE

## 2019-05-10 RX ORDER — CEFDINIR 300 MG/1
300 CAPSULE ORAL 2 TIMES DAILY
Qty: 6 CAPSULE | Refills: 0 | Status: SHIPPED | OUTPATIENT
Start: 2019-05-10 | End: 2019-05-13

## 2019-05-10 RX ORDER — GUAIFENESIN 100 MG/5ML
100 SOLUTION ORAL EVERY 4 HOURS PRN
Status: DISCONTINUED | OUTPATIENT
Start: 2019-05-10 | End: 2019-05-10

## 2019-05-10 NOTE — PLAN OF CARE
Problem: Patient Centered Care  Goal: Patient preferences are identified and integrated in the patient's plan of care  Description  Interventions:  - What would you like us to know as we care for you?  I live at home with my .  - Provide timely, co Assess pt frequently for physical needs  - Identify cognitive and physical deficits and behaviors that affect risk of falls.   - [place] fall precautions as indicated by assessment.  - Educate pt/family on patient safety including physical limitations  -

## 2019-05-10 NOTE — PLAN OF CARE
Problem: Patient Centered Care  Goal: Patient preferences are identified and integrated in the patient's plan of care  Description  Interventions:  - What would you like us to know as we care for you?  I live at home with my .  - Provide timely, co fall injury  Description  INTERVENTIONS:  - Assess pt frequently for physical needs  - Identify cognitive and physical deficits and behaviors that affect risk of falls.   - [place] fall precautions as indicated by assessment.  - Educate pt/family on patie

## 2019-05-10 NOTE — PLAN OF CARE
Problem: Patient Centered Care  Goal: Patient preferences are identified and integrated in the patient's plan of care  Description  Interventions:  - What would you like us to know as we care for you?  I live at home with my .  - Provide timely, co techniques  - Monitor for opioid side effects  - Notify MD/LIP if interventions unsuccessful or patient reports new pain  - Anticipate increased pain with activity and pre-medicate as appropriate  5/10/2019 0117 by Susan Rosenberg RN  Outcome: Gil Leon

## 2019-05-16 ENCOUNTER — OFFICE VISIT (OUTPATIENT)
Dept: OPHTHALMOLOGY | Facility: CLINIC | Age: 84
End: 2019-05-16
Payer: MEDICARE

## 2019-05-16 DIAGNOSIS — Z96.1 PSEUDOPHAKIA OF BOTH EYES: ICD-10-CM

## 2019-05-16 DIAGNOSIS — H43.391 FLOATER, VITREOUS, RIGHT: Primary | ICD-10-CM

## 2019-05-16 PROCEDURE — 92015 DETERMINE REFRACTIVE STATE: CPT | Performed by: OPHTHALMOLOGY

## 2019-05-16 PROCEDURE — 92004 COMPRE OPH EXAM NEW PT 1/>: CPT | Performed by: OPHTHALMOLOGY

## 2019-05-16 NOTE — DISCHARGE SUMMARY
[place] [facility] - [facility]    Discharge Summary    Ramonita Stevens Patient Status:  Observation    1933 MRN [id number]   Location [facility] [place] 5SW/SE Attending No att. providers found   Hosp Day # 0 PCP Josefina Smyth MD     Date of [address] cough so she came to the ER. She denied cp, vomiting, c/d, brbpr, melena, dysuria or hematuria. In the ER her O2 saturation was 89% on room air and she was placed on 2 L of oxygen for comfort. Chest x-ray was negative for pneumonia or heart failure.   Pa mouth daily. Refills:  0     atorvastatin 40 MG Tabs  Commonly known as:  LIPITOR      Take 40 mg by mouth daily. Refills:  0     LISINOPRIL OR      Take 40 mg by mouth daily. Refills:  0     Metoprolol Succinate ER 25 MG Tb24  Commonly known as:   To

## 2019-05-16 NOTE — PATIENT INSTRUCTIONS
Pseudophakia of both eyes  New glasses today; update as needed. Discussed that new prescription is only a slight change. Floater, vitreous, right  No treatment.    Reassured patient that eyes look very healthy; there is no evidence of glaucoma or mac

## 2019-05-16 NOTE — ASSESSMENT & PLAN NOTE
No treatment. Reassured patient that eyes look very healthy; there is no evidence of glaucoma or macular degeneration in either eye.

## 2019-05-16 NOTE — PROGRESS NOTES
Abiodun Griffith is a 80year old female.     HPI:     HPI     Eye Exam      Additional comments: Per Dr. Kamilah Baldwin has been seeing Dr. Blanca Franco in [place] but states that it's too far for them to travel and wants to switch all her d by mouth daily. Disp:  Rfl:    Cyanocobalamin (VITAMIN B-12 OR) Take 100 mcg by mouth daily.    Disp:  Rfl:        Allergies:    Codeine                 UNKNOWN  Penicillins               Sulfites                UNKNOWN  Tetracycline              Clindamyci Cylinder Candor Dist [facility] Add [place]    Right Morgan +0.25 070 20/25 +3.00 20/20    Left -0.75 +0.75 110 20/20- +3.00 20/20          Final Rx       Sphere Cylinder Candor Dist VA Add [place]    Right [place] +0.25 070 20/25 +3.00 20/20    Left -0.75 +0.75 110 20/2

## 2020-05-07 ENCOUNTER — APPOINTMENT (OUTPATIENT)
Dept: GENERAL RADIOLOGY | Facility: HOSPITAL | Age: 85
End: 2020-05-07
Attending: EMERGENCY MEDICINE
Payer: MEDICARE

## 2020-05-07 ENCOUNTER — HOSPITAL ENCOUNTER (EMERGENCY)
Facility: HOSPITAL | Age: 85
Discharge: HOME | End: 2020-05-07
Attending: EMERGENCY MEDICINE
Payer: MEDICARE

## 2020-05-07 ENCOUNTER — APPOINTMENT (OUTPATIENT)
Dept: CT IMAGING | Facility: HOSPITAL | Age: 85
End: 2020-05-07
Attending: EMERGENCY MEDICINE
Payer: MEDICARE

## 2020-05-07 VITALS
BODY MASS INDEX: 27.09 KG/M2 | HEART RATE: 69 BPM | OXYGEN SATURATION: 97 % | SYSTOLIC BLOOD PRESSURE: 140 MMHG | RESPIRATION RATE: 16 BRPM | HEIGHT: 60 IN | WEIGHT: 138 LBS | DIASTOLIC BLOOD PRESSURE: 66 MMHG | TEMPERATURE: 98 F

## 2020-05-07 DIAGNOSIS — S09.90XA INJURY OF HEAD, INITIAL ENCOUNTER: ICD-10-CM

## 2020-05-07 DIAGNOSIS — S52.124A CLOSED NONDISPLACED FRACTURE OF HEAD OF RIGHT RADIUS, INITIAL ENCOUNTER: Primary | ICD-10-CM

## 2020-05-07 PROCEDURE — 73060 X-RAY EXAM OF HUMERUS: CPT | Performed by: EMERGENCY MEDICINE

## 2020-05-07 PROCEDURE — 99284 EMERGENCY DEPT VISIT MOD MDM: CPT

## 2020-05-07 PROCEDURE — 73080 X-RAY EXAM OF ELBOW: CPT | Performed by: EMERGENCY MEDICINE

## 2020-05-07 PROCEDURE — 70450 CT HEAD/BRAIN W/O DYE: CPT | Performed by: EMERGENCY MEDICINE

## 2020-05-07 PROCEDURE — 29105 APPLICATION LONG ARM SPLINT: CPT

## 2020-05-07 NOTE — ED PROVIDER NOTES
Patient Seen in: [facility] AND [facility] Emergency Department      History   Patient presents with:  Upper Extremity Injury    Stated Complaint: right elbow from fall    HPI    70-year-old female presents for complaint of right elbow pain for the past 5 days. ED Triage Vitals [05/07/20 1055]   /75   Pulse 90   Resp 18   Temp 97.9 °F (36.6 °C)   Temp src Oral   SpO2 96 %   O2 Device None (Room air)       Current:/66   Pulse 69   Temp 97.7 °F (36.5 °C) (Oral)   Resp 16   Ht 152.4 cm (5')   Wt 62.6 kg Abdominal:      General: Bowel sounds are normal. There is no abdominal bruit. Palpations: Abdomen is soft. Abdomen is not rigid. Tenderness: There is no tenderness. There is no guarding or rebound.  Negative signs include Real's sign and McBurn Xr Elbow, Complete (min 3 Views), Right (cpt=73080)    Result Date: 5/7/2020  PROCEDURE: XR ELBOW, COMPLETE (MIN 3 VIEWS), RIGHT (CPT=73080)  COMPARISON: None. INDICATIONS: Right elbow pain post fall 5 days ago. TECHNIQUE: 4 views were obtained.    Eladio Farmer PROCEDURE: CT BRAIN OR HEAD (CPT=70450)  COMPARISON: None. INDICATIONS: Patient fell 4 days ago and is on baby aspirin head eval for subdural.  Has headache.   TECHNIQUE: CT images were obtained without contrast material.  Automated exposure control for do Disposition and Plan     Clinical Impression:  Closed nondisplaced fracture of head of right radius, initial encounter  (primary encounter diagnosis)  Injury of head, initial encounter    Disposition:  Discharge  5/7/2020 12:51 pm    Follow-up:  Ynes Dia

## 2020-05-07 NOTE — ED INITIAL ASSESSMENT (HPI)
Patient aox3 to ed via private vehicle patient co of mechanical fall x Sunday +head injury denies loc denies n/v/d patient fell onto right side of body co of right elbow pain 8/10 +swelling noted. Cms intact distal pulses present.  Patient on asa

## 2021-04-28 ENCOUNTER — HOSPITAL ENCOUNTER (OUTPATIENT)
Facility: HOSPITAL | Age: 86
Setting detail: OBSERVATION
Discharge: HOME HEALTH | End: 2021-04-30
Attending: EMERGENCY MEDICINE | Admitting: HOSPITALIST
Payer: MEDICARE

## 2021-04-28 DIAGNOSIS — N17.9 AKI (ACUTE KIDNEY INJURY) (HCC): Primary | ICD-10-CM

## 2021-04-28 DIAGNOSIS — K52.9 GASTROENTERITIS: ICD-10-CM

## 2021-04-28 RX ORDER — ONDANSETRON 2 MG/ML
4 INJECTION INTRAMUSCULAR; INTRAVENOUS ONCE
Status: COMPLETED | OUTPATIENT
Start: 2021-04-28 | End: 2021-04-28

## 2021-04-28 RX ORDER — FAMOTIDINE 20 MG/1
20 TABLET ORAL DAILY
COMMUNITY

## 2021-04-28 RX ORDER — DICYCLOMINE HCL 20 MG
20 TABLET ORAL ONCE
Status: COMPLETED | OUTPATIENT
Start: 2021-04-28 | End: 2021-04-28

## 2021-04-29 ENCOUNTER — ANESTHESIA (OUTPATIENT)
Dept: ENDOSCOPY | Facility: HOSPITAL | Age: 86
End: 2021-04-29
Payer: MEDICARE

## 2021-04-29 ENCOUNTER — ANESTHESIA EVENT (OUTPATIENT)
Dept: ENDOSCOPY | Facility: HOSPITAL | Age: 86
End: 2021-04-29
Payer: MEDICARE

## 2021-04-29 ENCOUNTER — APPOINTMENT (OUTPATIENT)
Dept: CT IMAGING | Facility: HOSPITAL | Age: 86
End: 2021-04-29
Attending: HOSPITALIST
Payer: MEDICARE

## 2021-04-29 PROBLEM — K52.9 GASTROENTERITIS: Status: ACTIVE | Noted: 2021-04-29

## 2021-04-29 PROCEDURE — 0DBN8ZX EXCISION OF SIGMOID COLON, VIA NATURAL OR ARTIFICIAL OPENING ENDOSCOPIC, DIAGNOSTIC: ICD-10-PCS | Performed by: INTERNAL MEDICINE

## 2021-04-29 PROCEDURE — 99219 INITIAL OBSERVATION CARE,LEVL II: CPT | Performed by: HOSPITALIST

## 2021-04-29 PROCEDURE — 74176 CT ABD & PELVIS W/O CONTRAST: CPT | Performed by: HOSPITALIST

## 2021-04-29 RX ORDER — NALOXONE HYDROCHLORIDE 0.4 MG/ML
80 INJECTION, SOLUTION INTRAMUSCULAR; INTRAVENOUS; SUBCUTANEOUS AS NEEDED
Status: DISCONTINUED | OUTPATIENT
Start: 2021-04-29 | End: 2021-04-29

## 2021-04-29 RX ORDER — CHOLECALCIFEROL (VITAMIN D3) 125 MCG
1000 CAPSULE ORAL DAILY
Status: DISCONTINUED | OUTPATIENT
Start: 2021-04-29 | End: 2021-04-30

## 2021-04-29 RX ORDER — SODIUM CHLORIDE, SODIUM LACTATE, POTASSIUM CHLORIDE, CALCIUM CHLORIDE 600; 310; 30; 20 MG/100ML; MG/100ML; MG/100ML; MG/100ML
INJECTION, SOLUTION INTRAVENOUS CONTINUOUS PRN
Status: DISCONTINUED | OUTPATIENT
Start: 2021-04-29 | End: 2021-04-29 | Stop reason: SURG

## 2021-04-29 RX ORDER — LIDOCAINE HYDROCHLORIDE 10 MG/ML
INJECTION, SOLUTION EPIDURAL; INFILTRATION; INTRACAUDAL; PERINEURAL AS NEEDED
Status: DISCONTINUED | OUTPATIENT
Start: 2021-04-29 | End: 2021-04-29 | Stop reason: SURG

## 2021-04-29 RX ORDER — KETOROLAC TROMETHAMINE 15 MG/ML
15 INJECTION, SOLUTION INTRAMUSCULAR; INTRAVENOUS EVERY 6 HOURS PRN
Status: DISCONTINUED | OUTPATIENT
Start: 2021-04-29 | End: 2021-04-30

## 2021-04-29 RX ORDER — MELATONIN
325
COMMUNITY

## 2021-04-29 RX ORDER — SODIUM CHLORIDE, SODIUM LACTATE, POTASSIUM CHLORIDE, CALCIUM CHLORIDE 600; 310; 30; 20 MG/100ML; MG/100ML; MG/100ML; MG/100ML
INJECTION, SOLUTION INTRAVENOUS CONTINUOUS
Status: ACTIVE | OUTPATIENT
Start: 2021-04-29 | End: 2021-04-29

## 2021-04-29 RX ORDER — ATORVASTATIN CALCIUM 40 MG/1
40 TABLET, FILM COATED ORAL NIGHTLY
Status: DISCONTINUED | OUTPATIENT
Start: 2021-04-29 | End: 2021-04-30

## 2021-04-29 RX ORDER — MULTIVIT-MIN/IRON/FOLIC ACID/K 18-600-40
CAPSULE ORAL DAILY
COMMUNITY

## 2021-04-29 RX ORDER — SODIUM CHLORIDE 9 MG/ML
INJECTION, SOLUTION INTRAVENOUS CONTINUOUS
Status: DISCONTINUED | OUTPATIENT
Start: 2021-04-29 | End: 2021-04-30

## 2021-04-29 RX ORDER — ONDANSETRON 2 MG/ML
4 INJECTION INTRAMUSCULAR; INTRAVENOUS EVERY 6 HOURS PRN
Status: DISCONTINUED | OUTPATIENT
Start: 2021-04-29 | End: 2021-04-30

## 2021-04-29 RX ADMIN — LIDOCAINE HYDROCHLORIDE 50 MG: 10 INJECTION, SOLUTION EPIDURAL; INFILTRATION; INTRACAUDAL; PERINEURAL at 13:54:00

## 2021-04-29 RX ADMIN — SODIUM CHLORIDE, SODIUM LACTATE, POTASSIUM CHLORIDE, CALCIUM CHLORIDE: 600; 310; 30; 20 INJECTION, SOLUTION INTRAVENOUS at 14:05:00

## 2021-04-29 RX ADMIN — SODIUM CHLORIDE, SODIUM LACTATE, POTASSIUM CHLORIDE, CALCIUM CHLORIDE: 600; 310; 30; 20 INJECTION, SOLUTION INTRAVENOUS at 13:50:00

## 2021-04-29 NOTE — CONSULTS
[address] Patient Status:  Observation    1933 MRN [id number]   Location [facility]5W Attending Eduarda Soares MD   Hosp Day # 0 PCP Bhavesh Hurt MD     Date of Admission: mg, Oral, Nightly  Vitamin D3 (Cholecalciferol) cap/tab 2,000 Units, 2,000 Units, Oral, Daily  Vitamin B-12 (VITAMIN B12) tab 1,000 mcg, 1,000 mcg, Oral, Daily      Social History:  reports that she has never smoked.  She has never used smokeless tobacco. S present. Back: No CVA tenderness. Extremities: No edema, cyanosis, or clubbing. Skin: Warm and dry. Rectal: Normal perianal areas, no masses felt.   There was not stool to test.  Laboratory Data:   Lab Results   Component Value Date    WBC 11.3 04/29/20 - 5.1 mmol/L 4.1 4.3   Chloride      98 - 112 mmol/L 112 108   Carbon Dioxide, Total      21.0 - 32.0 mmol/L 22.0 24.0   ANION GAP      0 - 18 mmol/L 9 9   BUN      7 - 18 mg/dL 23 (H) 26 (H)   CREATININE      0.55 - 1.02 mg/dL 1.27 (H) 1.29 (H)   BUN/CREA relate to underlying anemia. Trace pericardial fluid is partially delineated. There is   dependent subsegmental atelectasis bilaterally. Additional scattered ground-glass and reticular opacities are present and may be atelectatic in origin.    LIVER: No enl Heavy atherosclerotic vascular calcifications of the abdominal aorta are observed.  No aneurysm is detected. RETROPERITONEUM: No mass or lymphadenopathy is apparent.     BONES:   Minimal scoliosis is seen.  Multilevel degenerative changes seen throughout up such as ruq us and if abnml persists, consider [address] for flexible sigmoidoscopy with possible biopsies and possible polypectomy.   The procedure, indications, and risks (including perforation, bleeding, infection, and sedation related complication

## 2021-04-29 NOTE — ANESTHESIA POSTPROCEDURE EVALUATION
Patient: Ramonita Stevens    Procedure Summary     Date: 04/29/21 Room / Location: [address] [place] [address] ENDOSCOPY 04 / [address] [place] [address] ENDOSCOPY    Anesthesia Start: 7884 Anesthesia Stop: 9206    Procedure: FLEXIBLE SIGMOIDOSCOPY (N/A ) Diagnosis: (hemorrhoids, diverticulosis, moderate

## 2021-04-29 NOTE — ED QUICK NOTES
Orders for admission, patient is aware of plan and ready to go upstairs. Any questions, please call ED RN Chaitanya Daily  at extension [phone].      Type of COVID test sent:Rapid  COVID Suspicion level: No Detection    Titratable drug(s) infusing:Nothing infusing  Rat

## 2021-04-29 NOTE — ANESTHESIA PREPROCEDURE EVALUATION
Anesthesia PreOp Note    HPI:     Camryn Pretty is a 80year old female who presents for preoperative consultation requested by: Tracee Lozano MD    Date of Surgery: 4/28/2021 - 4/29/2021    Procedure(s):   FLEXIBLE SIGMOIDOSCOPY  Indication: Colitis    Re dinner.  , Disp: , Rfl: , 4/28/2021 at Unknown time  Metoprolol Succinate ER 25 MG Oral Tablet 24 Hr, Take 25 mg by mouth daily. , Disp: , Rfl: , 4/28/2021 at Unknown time  atorvastatin 40 MG Oral Tab, Take 40 mg by mouth daily.   , Disp: , Rfl: , 4/29/2021 Occupational History      Not on file    Tobacco Use      Smoking status: Never Smoker      Smokeless tobacco: Never Used    Substance and Sexual Activity      Alcohol use: No      Drug use: No      Sexual activity: Not on file    Other Topics      Concern °C). Her blood pressure is 145/63 and her pulse is 85. Her respiration is 18 and oxygen saturation is 95%. 04/29/21  0330 04/29/21  0331 04/29/21  0332 04/29/21  0731   BP: 128/88 125/55 109/55 145/63   Pulse: 63 68 70 85   Resp: 18 18 18 18   Temp: 98.

## 2021-04-29 NOTE — H&P
[facility]    PATIENT'S NAME: Kvng Jan   ATTENDING PHYSICIAN: Gurwinder Balderas MD   PATIENT ACCOUNT#:   [id number]    LOCATION:  [address] [place] [address] RECORD #:   [id number]       YOB: 1933  ADMISSION DATE:       04/28/2021 test was negative. Glucose was 112, sodium 141, potassium 4.3, chloride 108, CO2 of 24, BUN of 26 with a creatinine 1.26, calcium 9.3, anion gap of 9. Magnesium was 2.3. Her previous creatinines had been 0.92.   Urine was not obtained in the emergency ro the 12-point review of systems except as listed in the History of Present Illness. PHYSICAL EXAMINATION:    VITAL SIGNS:  Temperature 98.6, pulse 63, respiratory rate 16, blood pressure 128/88.   The patient did not have any episodes of elevated temper however obtain an EKG. 3.   Gastroesophageal reflux disease, placed on proton pump inhibitor. 4.   Dyslipidemia, continue statin. 5.   Hypertension.   Will monitor blood pressure given the bleeding that she had and adjust blood pressure medications as ne

## 2021-04-29 NOTE — PROGRESS NOTES
Post midnight follow up note.   Please refer to full H&P for assessment and plan     Enterocolitis, blood per rectum   CT scan reviewed  Send stool for cdiff and GI panel    GI consulted  Flex sig today   Continue PPI  Aspirin and bp meds on hold, resume as

## 2021-04-29 NOTE — PLAN OF CARE
Patient with bleeding from rectum, no stools tonight.  Plan for CT this am.   Problem: Patient Centered Care  Goal: Patient preferences are identified and integrated in the patient's plan of care  Description: Interventions:  - What would you like us to kno mobilization and activity  - Obtain nutritional consult as needed  - Establish a toileting routine/schedule  - Consider collaborating with pharmacy to review patient's medication profile  Outcome: Progressing

## 2021-04-29 NOTE — ED INITIAL ASSESSMENT (HPI)
Patient here with abdominal pain starting today. Patient was having vomiting and diarrhea. Per daughter patient now having bright red blood per rectum. Patient on aspirin.

## 2021-04-29 NOTE — OPERATIVE REPORT
Flexible Sigmoidoscopy Operative Report    Dina Kowalski Patient Status:  No patient class for patient encounter    1997 MRN [id number]   Location [place] Attending No att. providers found   [facility] Day # the procedure, findings, recommendations and follow up plans.      [facility]  4/29/2021  2031

## 2021-04-29 NOTE — CM/SW NOTE
SW called patient in room, did not answer as patient was showering. SW spoke with patient daughter, Bryant Mijares [phone], introduced self and role. Patient lives with her  (address correct on face sheet, 1 step to enter).  Daughter checks in on patient d

## 2021-04-29 NOTE — ED PROVIDER NOTES
Patient Seen in: [facility] AND [facility] Emergency Department      History   No chief complaint on file.     Stated Complaint: N/V/D, Rectal Bleeding     HPI/Subjective:   HPI    77-year-old female with history of hypertension, hyperlipidemia, CAD, on baby aspi ideas. All other systems reviewed and are negative. Positive for stated complaint: N/V/D, Rectal Bleeding   Other systems are as noted in HPI. Constitutional and vital signs reviewed.       All other systems reviewed and negative except as noted abo mOsm/kg    GFR, Non- 37 (L) >=60    GFR, -American 43 (L) >=60   PTT, ACTIVATED    Collection Time: 04/28/21  9:42 PM   Result Value Ref Range    PTT 25.7 23.2 - 35.3 seconds   PROTHROMBIN TIME (PT)    Collection Time: 04/28/21  9:42 episode of watery blood tinged diarrhea  - fluids ordered  - discussed with Dr. Mey Craig - requesting GI stool panel  - discussed with Dr. Jonh Schafer - informed her of pt consult    Medical Record Review: I personally reviewed available prior medical records fo

## 2021-04-29 NOTE — PLAN OF CARE
Pt continues to have mucous/ blood clots- no stool noted yet. Was prepped and went for flex sig today- showing mod/severe colitis ,diverticulitis and hemorrhoids. Awaiting stool studies to be completed and advancing diet to clear liqs today.  Will cont to brenna function  Description: INTERVENTIONS:  - Assess bowel function  - Maintain adequate hydration with IV or PO as ordered and tolerated  - Evaluate effectiveness of GI medications  - Encourage mobilization and activity  - Obtain nutritional consult as needed

## 2021-04-30 PROCEDURE — 99217 OBSERVATION CARE DISCHARGE: CPT | Performed by: HOSPITALIST

## 2021-04-30 RX ORDER — ASPIRIN 81 MG/1
81 TABLET ORAL DAILY
Refills: 0 | Status: SHIPPED | COMMUNITY
Start: 2021-05-03

## 2021-04-30 NOTE — PLAN OF CARE
Problem: Patient Centered Care  Goal: Patient preferences are identified and integrated in the patient's plan of care  Description: Interventions:  - What would you like us to know as we care for you?  Home with   - Provide timely, complete, and ac Kendall Randolph, RN  Outcome: Progressing  Goal: Maintains or returns to baseline bowel function  Description: INTERVENTIONS:  - Assess bowel function  - Maintain adequate hydration with IV or PO as ordered and tolerated  - Evaluate effectiveness of GI medicati

## 2021-04-30 NOTE — HOME CARE LIAISON
Received referral for Home Health services via [facility]. [facility] able to clinically accept. Will follow up when appropriate.      11:00AM: Spoke w/ patient and provided with list of [facility] providers from [facility], patient choice is [facility]

## 2021-04-30 NOTE — PLAN OF CARE
Problem: Patient Centered Care  Goal: Patient preferences are identified and integrated in the patient's plan of care  Description: Interventions:  - What would you like us to know as we care for you?  Home with   - Provide timely, complete, and ac a toileting routine/schedule  - Consider collaborating with pharmacy to review patient's medication profile  Outcome: Progressing       Patient is alert and orientated x4.  RA.  VSS patient is ambulating with minimal assist. Patient remains on a clear liqui

## 2021-04-30 NOTE — DISCHARGE SUMMARY
[facility]    Discharge Summary    Nicholas Elam Patient Status:  Observation    1933 MRN [id number]   Location [facility] Attending Tiffanie Valverde MD   Hosp Day # 0 PCP Pushpa Murray MD     Date of Admission:  pass stool, but could not initially. Subsequently, she had multiple bowel movements and initially the stool was formed. Subsequently, it was soft and liquid and toward the end, she noted blood in the toilet.   She said there was [place] lot of blood\" in the Medicine  Contact information:  [address] [facility] [address] [phone]             Araceli Ladd MD In 2 weeks.     Specialty: GASTROENTEROLOGY  Contact information:  [address] [place] [address] [phone] diet for dinner tonight then advance to soft tomorrow  - keep on soft diet this week      BOB  -resolved  - hold ACE and Amlodipine  - continue on metoprolol  - resume ACE and Amlodipine when ok with her PCP    Gabriele Kam MD

## 2021-04-30 NOTE — PROGRESS NOTES
[facility]    Progress Note    Lori Grove Patient Status:  Inpatient    1933 MRN [id number]   Location [facility] 3W/SW Attending Catherine Harry MD   Hosp Day # 0 PCP Ursula Rico MD     Date of Admission:  2021  SUBJE RASH    Physical Exam:   Blood pressure 135/57, pulse 64, temperature 97.8 °F (36.6 °C), temperature source Oral, resp. rate 16, height 5' (1.524 m), weight 137 lb 14.4 oz (62.6 kg), SpO2 96 %.     General appearance:  alert, appears state Eosinophils %      % 1.6 0.3   Basophils %      % 0.3 0.3   Immature Granulocyte %      % 0.3 0.2   Glucose      70 - 99 mg/dL 78 90   Sodium      136 - 145 mmol/L 143 143   Potassium      3.5 - 5.1 mmol/L 3.7 4.1   Chloride      98 - 112 mmol/L 111 112 Assessment/Plan:    Patient is a 80year old female who was admitted to the hospital for BOB (acute kidney injury) [facility]):  Patient Active Problem List:     ST elevation myocardial infarction (STEMI), unspecified artery ([facility] [address]     Acute chest pain     Ac

## 2021-04-30 NOTE — CM/SW NOTE
[facility] notified by [facility] that patient has chosen [facility]. SW called and notified daughter, Ryan Lozano. [facility] received completed POA-HC forms from Ryan Lozano and sent them to [employer] [address]. Plan: Home with [facility], orders and f2f sent to [facility] MD mobley.        [facility]

## 2021-05-01 VITALS
SYSTOLIC BLOOD PRESSURE: 141 MMHG | OXYGEN SATURATION: 93 % | BODY MASS INDEX: 27.07 KG/M2 | WEIGHT: 137.88 LBS | HEIGHT: 60 IN | HEART RATE: 64 BPM | RESPIRATION RATE: 18 BRPM | DIASTOLIC BLOOD PRESSURE: 46 MMHG | TEMPERATURE: 98 F

## 2021-05-03 ENCOUNTER — PATIENT OUTREACH (OUTPATIENT)
Dept: CASE MANAGEMENT | Age: 86
End: 2021-05-03

## 2021-05-03 NOTE — PROGRESS NOTES
Patients daughter Erica Carpenter called and LVM requesting assistance in sched HHC-I called Pat back and she had already sched [address] to come today-I offered to help her sched hosp fup and she declined stating she would make those appts herself      Brittany Le MD I

## 2022-08-10 ENCOUNTER — APPOINTMENT (OUTPATIENT)
Dept: GENERAL RADIOLOGY | Facility: HOSPITAL | Age: 87
End: 2022-08-10
Attending: EMERGENCY MEDICINE
Payer: MEDICARE

## 2022-08-10 ENCOUNTER — HOSPITAL ENCOUNTER (EMERGENCY)
Facility: HOSPITAL | Age: 87
Discharge: HOME | End: 2022-08-10
Attending: EMERGENCY MEDICINE
Payer: MEDICARE

## 2022-08-10 VITALS
RESPIRATION RATE: 21 BRPM | HEART RATE: 55 BPM | BODY MASS INDEX: 27.29 KG/M2 | HEIGHT: 60 IN | OXYGEN SATURATION: 95 % | TEMPERATURE: 98 F | WEIGHT: 139 LBS | SYSTOLIC BLOOD PRESSURE: 152 MMHG | DIASTOLIC BLOOD PRESSURE: 69 MMHG

## 2022-08-10 DIAGNOSIS — R07.9 CHEST PAIN OF UNCERTAIN ETIOLOGY: Primary | ICD-10-CM

## 2022-08-10 LAB
ANION GAP SERPL CALC-SCNC: 9 MMOL/L (ref 0–18)
BASOPHILS # BLD AUTO: 0.05 X10(3) UL (ref 0–0.2)
BASOPHILS NFR BLD AUTO: 0.6 %
BUN BLD-MCNC: 28 MG/DL (ref 7–18)
BUN/CREAT SERPL: 26.2 (ref 10–20)
CALCIUM BLD-MCNC: 9.5 MG/DL (ref 8.5–10.1)
CHLORIDE SERPL-SCNC: 105 MMOL/L (ref 98–112)
CO2 SERPL-SCNC: 25 MMOL/L (ref 21–32)
CREAT BLD-MCNC: 1.07 MG/DL
DEPRECATED RDW RBC AUTO: 48.5 FL (ref 35.1–46.3)
EOSINOPHIL # BLD AUTO: 0.54 X10(3) UL (ref 0–0.7)
EOSINOPHIL NFR BLD AUTO: 6.8 %
ERYTHROCYTE [DISTWIDTH] IN BLOOD BY AUTOMATED COUNT: 13.3 % (ref 11–15)
GFR SERPLBLD BASED ON 1.73 SQ M-ARVRAT: 50 ML/MIN/1.73M2 (ref 60–?)
GLUCOSE BLD-MCNC: 90 MG/DL (ref 70–99)
HCT VFR BLD AUTO: 35.8 %
HGB BLD-MCNC: 11 G/DL
IMM GRANULOCYTES # BLD AUTO: 0.03 X10(3) UL (ref 0–1)
IMM GRANULOCYTES NFR BLD: 0.4 %
LYMPHOCYTES # BLD AUTO: 1.04 X10(3) UL (ref 1–4)
LYMPHOCYTES NFR BLD AUTO: 13.1 %
MCH RBC QN AUTO: 30.1 PG (ref 26–34)
MCHC RBC AUTO-ENTMCNC: 30.7 G/DL (ref 31–37)
MCV RBC AUTO: 97.8 FL
MONOCYTES # BLD AUTO: 0.75 X10(3) UL (ref 0.1–1)
MONOCYTES NFR BLD AUTO: 9.5 %
NEUTROPHILS # BLD AUTO: 5.51 X10 (3) UL (ref 1.5–7.7)
NEUTROPHILS # BLD AUTO: 5.51 X10(3) UL (ref 1.5–7.7)
NEUTROPHILS NFR BLD AUTO: 69.6 %
OSMOLALITY SERPL CALC.SUM OF ELEC: 293 MOSM/KG (ref 275–295)
PLATELET # BLD AUTO: 277 10(3)UL (ref 150–450)
POTASSIUM SERPL-SCNC: 3.9 MMOL/L (ref 3.5–5.1)
RBC # BLD AUTO: 3.66 X10(6)UL
SARS-COV-2 RNA RESP QL NAA+PROBE: NOT DETECTED
SODIUM SERPL-SCNC: 139 MMOL/L (ref 136–145)
TROPONIN I HIGH SENSITIVITY: 7 NG/L
TROPONIN I HIGH SENSITIVITY: 8 NG/L
WBC # BLD AUTO: 7.9 X10(3) UL (ref 4–11)

## 2022-08-10 PROCEDURE — 71045 X-RAY EXAM CHEST 1 VIEW: CPT | Performed by: EMERGENCY MEDICINE

## 2022-08-10 PROCEDURE — 84484 ASSAY OF TROPONIN QUANT: CPT | Performed by: EMERGENCY MEDICINE

## 2022-08-10 PROCEDURE — 85025 COMPLETE CBC W/AUTO DIFF WBC: CPT | Performed by: EMERGENCY MEDICINE

## 2022-08-10 PROCEDURE — 99284 EMERGENCY DEPT VISIT MOD MDM: CPT

## 2022-08-10 PROCEDURE — 93010 ELECTROCARDIOGRAM REPORT: CPT | Performed by: EMERGENCY MEDICINE

## 2022-08-10 PROCEDURE — 80048 BASIC METABOLIC PNL TOTAL CA: CPT | Performed by: EMERGENCY MEDICINE

## 2022-08-10 PROCEDURE — 93005 ELECTROCARDIOGRAM TRACING: CPT

## 2022-08-10 PROCEDURE — 36415 COLL VENOUS BLD VENIPUNCTURE: CPT

## 2022-08-10 NOTE — ED INITIAL ASSESSMENT (HPI)
Pt to ED with c/o mid sternal chest pain and dyspnea that started at 1100 today. Pt denies nausea or vomiting. Pt denies fall or trauma. Pt denies cough or fever. No respiratory distress noted. Pt able to speak in full sentences. Pt skin warm and dry. Pt is alert and oriented x4.

## 2022-10-16 ENCOUNTER — APPOINTMENT (OUTPATIENT)
Dept: CT IMAGING | Facility: HOSPITAL | Age: 87
End: 2022-10-16
Attending: EMERGENCY MEDICINE
Payer: MEDICARE

## 2022-10-16 ENCOUNTER — APPOINTMENT (OUTPATIENT)
Dept: GENERAL RADIOLOGY | Facility: HOSPITAL | Age: 87
End: 2022-10-16
Attending: EMERGENCY MEDICINE
Payer: MEDICARE

## 2022-10-16 ENCOUNTER — HOSPITAL ENCOUNTER (EMERGENCY)
Facility: HOSPITAL | Age: 87
Discharge: HOME | End: 2022-10-16
Attending: EMERGENCY MEDICINE
Payer: MEDICARE

## 2022-10-16 VITALS
RESPIRATION RATE: 16 BRPM | OXYGEN SATURATION: 96 % | HEART RATE: 72 BPM | WEIGHT: 135 LBS | SYSTOLIC BLOOD PRESSURE: 162 MMHG | BODY MASS INDEX: 26 KG/M2 | TEMPERATURE: 98 F | DIASTOLIC BLOOD PRESSURE: 74 MMHG

## 2022-10-16 DIAGNOSIS — S62.651A CLOSED NONDISPLACED FRACTURE OF MIDDLE PHALANX OF LEFT INDEX FINGER, INITIAL ENCOUNTER: Primary | ICD-10-CM

## 2022-10-16 PROCEDURE — 26720 TREAT FINGER FRACTURE EACH: CPT

## 2022-10-16 PROCEDURE — 99284 EMERGENCY DEPT VISIT MOD MDM: CPT

## 2022-10-16 PROCEDURE — 70486 CT MAXILLOFACIAL W/O DYE: CPT | Performed by: EMERGENCY MEDICINE

## 2022-10-16 PROCEDURE — 70450 CT HEAD/BRAIN W/O DYE: CPT | Performed by: EMERGENCY MEDICINE

## 2022-10-16 PROCEDURE — 73140 X-RAY EXAM OF FINGER(S): CPT | Performed by: EMERGENCY MEDICINE

## 2022-10-16 NOTE — ED INITIAL ASSESSMENT (HPI)
Patient states was walking when she tripped and fell, she did hit her left side of eyebrow. Patient also c/o pain to left 2nd digit. Denies LOC. Denies blood thinners.

## 2024-11-22 NOTE — ED PROVIDER NOTES
Patient Seen in: [facility] AND [facility] Emergency Department    History   Patient presents with:  Chest Pain Angina (cardiovascular)    Stated Complaint:     HPI    Patient is an 60-year-old female who presents with substernal chest pressure that awoke her fr 62.6 kg   SpO2 100%   BMI 26.07 kg/m²         Physical Exam    GENERAL: Mild to moderate distress, awake and alert  HEENT: MMM, EOMI, PERRL  Neck: supple, non tender  CV: RRR, no murmurs.  Pulses intact  Resp: CTAB, no wheezes or retractions  Ab: soft, nont Pulse Readings from Last 1 Encounters:  07/27/17 : 87  , sinus         X-ray chest one view at 0130 hours      IMPRESSION:  -No evidence of acute cardiopulmonary disease.    -Stable mild cardiomegaly.  -Stable tortuous or ectatic ascending aorta.  -Small h [facility]  WOUNDCARE [address]  Scheduled Appointment: 12/09/2024     [facility]  WOUNDCARE [address]  Scheduled Appointment: 12/02/2024     [facility]  WOUNDCARE [address]  Scheduled Appointment: 12/16/2024    [facility]  INFDISEASE [address]  Scheduled Appointment: 01/09/2025

## 2024-12-30 ENCOUNTER — HOSPITAL ENCOUNTER (OUTPATIENT)
Facility: HOSPITAL | Age: 89
Setting detail: OBSERVATION
Discharge: HOME | End: 2024-12-31
Attending: EMERGENCY MEDICINE | Admitting: HOSPITALIST
Payer: MEDICARE

## 2024-12-30 ENCOUNTER — APPOINTMENT (OUTPATIENT)
Dept: CV DIAGNOSTICS | Facility: HOSPITAL | Age: 89
End: 2024-12-30
Attending: INTERNAL MEDICINE
Payer: MEDICARE

## 2024-12-30 ENCOUNTER — APPOINTMENT (OUTPATIENT)
Dept: GENERAL RADIOLOGY | Facility: HOSPITAL | Age: 89
End: 2024-12-30
Attending: EMERGENCY MEDICINE
Payer: MEDICARE

## 2024-12-30 DIAGNOSIS — R07.9 CHEST PAIN OF UNCERTAIN ETIOLOGY: Primary | ICD-10-CM

## 2024-12-30 PROBLEM — I20.9 ANGINA PECTORIS: Status: ACTIVE | Noted: 2024-12-30

## 2024-12-30 PROBLEM — I20.9 ANGINA PECTORIS (HCC): Status: ACTIVE | Noted: 2024-12-30

## 2024-12-30 LAB
ALBUMIN SERPL-MCNC: 4.1 G/DL (ref 3.2–4.8)
ALBUMIN/GLOB SERPL: 1.6 {RATIO} (ref 1–2)
ALP LIVER SERPL-CCNC: 72 U/L
ALT SERPL-CCNC: 9 U/L
ANION GAP SERPL CALC-SCNC: 6 MMOL/L (ref 0–18)
AST SERPL-CCNC: 17 U/L (ref ?–34)
BASOPHILS # BLD AUTO: 0.03 X10(3) UL (ref 0–0.2)
BASOPHILS NFR BLD AUTO: 0.4 %
BILIRUB SERPL-MCNC: 0.3 MG/DL (ref 0.2–0.9)
BUN BLD-MCNC: 19 MG/DL (ref 9–23)
BUN/CREAT SERPL: 18.1 (ref 10–20)
CALCIUM BLD-MCNC: 9.8 MG/DL (ref 8.7–10.4)
CHLORIDE SERPL-SCNC: 106 MMOL/L (ref 98–112)
CO2 SERPL-SCNC: 27 MMOL/L (ref 21–32)
CREAT BLD-MCNC: 1.05 MG/DL
D DIMER PPP FEU-MCNC: 0.45 UG/ML FEU (ref ?–0.91)
DEPRECATED RDW RBC AUTO: 46.5 FL (ref 35.1–46.3)
EGFRCR SERPLBLD CKD-EPI 2021: 50 ML/MIN/1.73M2 (ref 60–?)
EOSINOPHIL # BLD AUTO: 0.2 X10(3) UL (ref 0–0.7)
EOSINOPHIL NFR BLD AUTO: 2.7 %
ERYTHROCYTE [DISTWIDTH] IN BLOOD BY AUTOMATED COUNT: 13.2 % (ref 11–15)
GLOBULIN PLAS-MCNC: 2.5 G/DL (ref 2–3.5)
GLUCOSE BLD-MCNC: 94 MG/DL (ref 70–99)
HCT VFR BLD AUTO: 37.2 %
HGB BLD-MCNC: 12.4 G/DL
IMM GRANULOCYTES # BLD AUTO: 0.03 X10(3) UL (ref 0–1)
IMM GRANULOCYTES NFR BLD: 0.4 %
LYMPHOCYTES # BLD AUTO: 1.39 X10(3) UL (ref 1–4)
LYMPHOCYTES NFR BLD AUTO: 19.1 %
MCH RBC QN AUTO: 31.7 PG (ref 26–34)
MCHC RBC AUTO-ENTMCNC: 33.3 G/DL (ref 31–37)
MCV RBC AUTO: 95.1 FL
MONOCYTES # BLD AUTO: 0.77 X10(3) UL (ref 0.1–1)
MONOCYTES NFR BLD AUTO: 10.6 %
NEUTROPHILS # BLD AUTO: 4.86 X10 (3) UL (ref 1.5–7.7)
NEUTROPHILS # BLD AUTO: 4.86 X10(3) UL (ref 1.5–7.7)
NEUTROPHILS NFR BLD AUTO: 66.8 %
OSMOLALITY SERPL CALC.SUM OF ELEC: 290 MOSM/KG (ref 275–295)
PLATELET # BLD AUTO: 229 10(3)UL (ref 150–450)
POTASSIUM SERPL-SCNC: 3.6 MMOL/L (ref 3.5–5.1)
PROT SERPL-MCNC: 6.6 G/DL (ref 5.7–8.2)
RBC # BLD AUTO: 3.91 X10(6)UL
SODIUM SERPL-SCNC: 139 MMOL/L (ref 136–145)
TROPONIN I SERPL HS-MCNC: 5 NG/L
TROPONIN I SERPL HS-MCNC: 5 NG/L
TROPONIN I SERPL HS-MCNC: 7 NG/L
WBC # BLD AUTO: 7.3 X10(3) UL (ref 4–11)

## 2024-12-30 PROCEDURE — 93306 TTE W/DOPPLER COMPLETE: CPT | Performed by: INTERNAL MEDICINE

## 2024-12-30 PROCEDURE — 71045 X-RAY EXAM CHEST 1 VIEW: CPT | Performed by: EMERGENCY MEDICINE

## 2024-12-30 PROCEDURE — 99222 1ST HOSP IP/OBS MODERATE 55: CPT | Performed by: HOSPITALIST

## 2024-12-30 RX ORDER — METOCLOPRAMIDE HYDROCHLORIDE 5 MG/ML
5 INJECTION INTRAMUSCULAR; INTRAVENOUS EVERY 8 HOURS PRN
Status: DISCONTINUED | OUTPATIENT
Start: 2024-12-30 | End: 2024-12-31

## 2024-12-30 RX ORDER — ESCITALOPRAM OXALATE 10 MG/1
10 TABLET ORAL DAILY
Status: DISCONTINUED | OUTPATIENT
Start: 2024-12-30 | End: 2024-12-31

## 2024-12-30 RX ORDER — METOPROLOL SUCCINATE 25 MG/1
25 TABLET, EXTENDED RELEASE ORAL DAILY
Status: DISCONTINUED | OUTPATIENT
Start: 2024-12-30 | End: 2024-12-31

## 2024-12-30 RX ORDER — ASPIRIN 325 MG
325 TABLET, DELAYED RELEASE (ENTERIC COATED) ORAL DAILY
Status: DISCONTINUED | OUTPATIENT
Start: 2024-12-30 | End: 2024-12-31

## 2024-12-30 RX ORDER — HEPARIN SODIUM 5000 [USP'U]/ML
5000 INJECTION, SOLUTION INTRAVENOUS; SUBCUTANEOUS EVERY 12 HOURS SCHEDULED
Status: DISCONTINUED | OUTPATIENT
Start: 2024-12-30 | End: 2024-12-31

## 2024-12-30 RX ORDER — FAMOTIDINE 20 MG/1
20 TABLET, FILM COATED ORAL DAILY
Status: DISCONTINUED | OUTPATIENT
Start: 2024-12-30 | End: 2024-12-31

## 2024-12-30 RX ORDER — LORATADINE 10 MG/1
10 TABLET ORAL DAILY
COMMUNITY

## 2024-12-30 RX ORDER — CETIRIZINE HYDROCHLORIDE 5 MG/1
5 TABLET ORAL DAILY
Status: DISCONTINUED | OUTPATIENT
Start: 2024-12-30 | End: 2024-12-31

## 2024-12-30 RX ORDER — NITROGLYCERIN 0.4 MG/1
0.4 TABLET SUBLINGUAL EVERY 5 MIN PRN
Status: DISCONTINUED | OUTPATIENT
Start: 2024-12-30 | End: 2024-12-31

## 2024-12-30 RX ORDER — ONDANSETRON 2 MG/ML
4 INJECTION INTRAMUSCULAR; INTRAVENOUS EVERY 6 HOURS PRN
Status: DISCONTINUED | OUTPATIENT
Start: 2024-12-30 | End: 2024-12-31

## 2024-12-30 RX ORDER — CETIRIZINE HYDROCHLORIDE 10 MG/1
10 TABLET ORAL DAILY
Status: DISCONTINUED | OUTPATIENT
Start: 2024-12-30 | End: 2024-12-30

## 2024-12-30 RX ORDER — ACETAMINOPHEN 500 MG
500 TABLET ORAL EVERY 4 HOURS PRN
Status: DISCONTINUED | OUTPATIENT
Start: 2024-12-30 | End: 2024-12-31

## 2024-12-30 RX ORDER — AMLODIPINE BESYLATE 2.5 MG/1
2.5 TABLET ORAL DAILY
Status: DISCONTINUED | OUTPATIENT
Start: 2024-12-30 | End: 2024-12-31

## 2024-12-30 RX ORDER — MELATONIN
1000 DAILY
COMMUNITY

## 2024-12-30 RX ORDER — ESCITALOPRAM OXALATE 5 MG/1
10 TABLET ORAL DAILY
COMMUNITY
Start: 2024-08-26

## 2024-12-30 NOTE — H&P
[facility]    PATIENT'S NAME: MUCHA, ANNALEE E   ATTENDING PHYSICIAN: Anusha Higgins MD   PATIENT ACCOUNT#:   [id number]    LOCATION:  [address] [place] [address]  MEDICAL RECORD #:   [id number]       YOB: 1933  ADMISSION DATE:       12/30/2024    HISTORY AND PHYSICAL EXAMINATION    CHIEF COMPLAINT:  Angina pectoris.    HISTORY OF PRESENT ILLNESS:  Patient is a 89-year-old  female with known underlying coronary artery disease, presented today to the emergency department for evaluation of midsternal chest tightness associated with left arm pain intermittently overnight.  In the emergency room, EKG showed right bundle-branch block which is chronic.  First troponin, CBC, chemistry, and chest x-ray were unremarkable.     PAST MEDICAL HISTORY:  History of LAD PCI stent with prior cardiac angiogram in 2017, which showed jailed first septal  and first obtuse marginal 80% bifurcation stenosis.  Also, she had nonobstructive disease in the LAD and left main.  She has history of chronic right bundle-branch block, hypertension, hyperlipidemia, cerebrovascular accident, gastroesophageal reflux disease, chronic kidney disease stage 3, and osteoarthritis.      PAST SURGICAL HISTORY:  None.    MEDICATIONS:  Please see medication reconciliation list.     ALLERGIES:  No known drug allergies.    FAMILY HISTORY:  Positive for hypertension and coronary artery disease.    SOCIAL HISTORY:  No tobacco, alcohol, or drug use.  Lives with her family.  Independent for basic activities of daily living.     REVIEW OF SYSTEMS:  Patient said she gets sporadic midsternal chest tightness that is usually brief and goes away spontaneously.  Last night, she had intermittent discomfort in her midsternal area radiating to her left arm throughout the night.  She could not sleep and decided to come in for evaluation to the emergency room.  Other 12-point review of systems is negative.       PHYSICAL EXAMINATION:    GENERAL:   Alert and oriented to time, place and person.  No acute distress.   VITAL SIGNS:  Temperature 98.0, pulse 56, respiratory rate 15, blood pressure 157/59, pulse ox 94% on room air.    HEENT:  Atraumatic.  Oropharynx clear.  Dry mucous membranes.  Ears and nose normal.  Eyes:  Anicteric sclerae.   NECK:  Supple.  No lymphadenopathy.  Trachea midline.  Full range of motion.   LUNGS:  Clear to auscultation bilaterally.  Normal respiratory effort.    HEART:  Regular rate and rhythm.  S1 and S2 auscultated.  No murmur.    ABDOMEN:  Soft, nondistended.  No tenderness.  Positive bowel sounds.   EXTREMITIES:  No peripheral edema, clubbing or cyanosis.   NEUROLOGIC:  Motor and sensory intact.      ASSESSMENT:    1.   Chest pain consistent with angina pectoris with known underlying coronary artery disease.  2.   Essential hypertension.    PLAN:  Patient will be admitted to telemetry floor.  Continue to trend troponin level.  Obtain cardiology consult.  Full-dose aspirin.  Further recommendations to follow.     Dictated By Anusha Higgins MD  d: 12/30/2024 11:17:57  t: 12/30/2024 11:55:22  Job [id number]  /

## 2024-12-30 NOTE — ED INITIAL ASSESSMENT (HPI)
Patient complains of chest pain intermittently for a few days, states it worsened last night, states she could not lay flat, states left arm began to hurt last night but does not hurt now

## 2024-12-30 NOTE — CONSULTS
[facility]  part of [facility]    Cardiology Consultation    Annalee E Mucha Patient Status:  Observation    1933 MRN [id number]   Location [facility] 3W/SW Attending Anusha Higgins MD   Hosp Day # 0 PCP None Pcp     Date of Admission:  2024  Date of Consult:  2024  Reason for Consultation: Chest pain    History of Present Illness:   Patient is a 89 year old female with significant past medical history of CAD status post PCI in 2017, old RBBB, hypertension, CKD stage III who presents with acute onset chest pain overnight.  Patient reports that she had woken up from sleep with chest pressure substernally, was intermittent throughout the night and morning prior to full resolution.  Presented to the ED for further evaluation.  Currently is chest pain-free, denies any exertional or stress component.  No palpitation, lightheadedness/dizziness or syncope.  No associated lower extremity edema, orthopnea.  She has had serial high-sensitivity troponin levels that have been negative x 3. .  An acute cardiopulmonary process.  ECG with old RBBB without any acute ischemic changes.    Assessment and Plan:   Atypical chest pain  History of CAD s/p PCI in 2017  Old RBBB  Hypertension  CKD stage III    In regards to atypical chest pain, occurred overnight resolved by this morning, no exertional/stress component  Unlikely ACS given negative serial high-sensitivity troponin levels x 3, ECG without acute ischemic changes, no active chest pain  Unlikely PE given age-adjusted D-dimer within normal limits  Telemetry has been uneventful  Will obtain TTE, if unremarkable will proceed with further evaluation as outpatient  Recommend outpatient HST/PSG for evaluation of obstructive sleep apnea  Consider outpatient stress PET    Past Medical History  Past Medical History:    Cancer (HCC)    Coronary atherosclerosis    Esophageal reflux    Essential hypertension    High blood pressure     High cholesterol    Stroke (HCC)       Past Surgical History  Past Surgical History:   Procedure Laterality Date    Cataract extraction w/  intraocular lens implant Bilateral 2009    Dr. Carlos     Cath percutaneous  transluminal coronary angioplasty      Yag capsulotomy - ou - both eyes Bilateral     Dr. Carlos       Family History  Family History   Problem Relation Age of Onset    Glaucoma Mother     Cataracts Mother     Macular degeneration Mother     Cataracts Sister     Diabetes Neg        Social History  Pediatric History   Patient Parents    Not on file     Other Topics Concern    Not on file   Social History Narrative    Not on file           Current Medications:  Current Facility-Administered Medications   Medication Dose Route Frequency    heparin (Porcine) 5000 UNIT/ML injection 5,000 Units  5,000 Units Subcutaneous 2 times per day    acetaminophen (Tylenol Extra Strength) tab 500 mg  500 mg Oral Q4H PRN    ondansetron (Zofran) 4 MG/2ML injection 4 mg  4 mg Intravenous Q6H PRN    metoclopramide (Reglan) 5 mg/mL injection 5 mg  5 mg Intravenous Q8H PRN    nitroglycerin (Nitrostat) SL tab 0.4 mg  0.4 mg Sublingual Q5 Min PRN    aspirin DR tab 325 mg  325 mg Oral Daily    amLODIPine (Norvasc) tab 2.5 mg  2.5 mg Oral Daily    escitalopram (Lexapro) tab 10 mg  10 mg Oral Daily    famotidine (Pepcid) tab 20 mg  20 mg Oral Daily    metoprolol succinate ER (Toprol XL) 24 hr tab 25 mg  25 mg Oral Daily    influenza virus trivalent high dose PF (Fluzone HD) 0.5 mL injection (ages >/= 65 years) 0.5 mL  0.5 mL Intramuscular Prior to discharge    cetirizine (ZyrTEC) tab 5 mg  5 mg Oral Daily     Medications Prior to Admission   Medication Sig    cyanocobalamin 1000 MCG Oral Tab Take 1 tablet (1,000 mcg total) by mouth daily.    Ferrous Sulfate (SLOW FE OR) Take 40 mg/kg of iron by mouth daily.    loratadine 10 MG Oral Tab Take 1 tablet (10 mg total) by mouth daily.    escitalopram 5 MG Oral Tab Take 2 tablets (10 mg  total) by mouth daily.    amLODIPine Besylate 5 MG Oral Tab Take 0.5 tablets (2.5 mg total) by mouth daily.    aspirin 81 MG Oral Tab EC Take 1 tablet (81 mg total) by mouth daily.    Cholecalciferol (VITAMIN D) 50 MCG (2000 UT) Oral Cap Take 1 capsule (2,000 Units total) by mouth daily.    famoTIDine 20 MG Oral Tab Take 1 tablet (20 mg total) by mouth daily.    Metoprolol Succinate ER 25 MG Oral Tablet 24 Hr Take 1 tablet (25 mg total) by mouth daily.       Allergies  Allergies[1]    Review of Systems:   ROS  10 point review of systems completed and negative except as noted.    Physical Exam:   Patient Vitals for the past 24 hrs:   BP Temp Temp src Pulse Resp SpO2 Height Weight   12/30/24 1510 132/66 98.6 °F (37 °C) Oral 60 20 95 % -- --   12/30/24 1349 142/70 -- -- 63 -- -- -- --   12/30/24 1304 -- -- -- -- -- -- 5' (1.524 m) 131 lb 11.2 oz (59.7 kg)   12/30/24 1239 152/65 -- -- 64 -- -- -- --   12/30/24 1232 -- -- -- 64 -- -- -- --   12/30/24 1130 (!) 188/63 98.6 °F (37 °C) Oral 72 18 97 % -- --   12/30/24 1115 (!) 161/60 -- -- 62 17 94 % -- --   12/30/24 1030 157/59 -- -- 56 15 94 % -- --   12/30/24 0945 137/61 -- -- 57 19 94 % -- --   12/30/24 0900 141/69 98 °F (36.7 °C) Temporal 69 18 98 % -- --       Intake/Output:   Last 3 shifts:   Intake/Output                   12/28/24 0700 - 12/29/24 0659 (Not Admitted) 12/29/24 0700 - 12/30/24 0659 (Not Admitted) 12/30/24 0700 - 12/31/24 0659       Intake    P.O.  --  --  240    P.O. -- -- 240    I.V.  --  --  10    I.V. -- -- 10    Total Intake -- -- 250       Output    Total Output -- -- --       Net I/O     -- -- 250          Vent Settings:    Hemodynamic parameters (last 24 hours):    Scheduled Meds:    heparin  5,000 Units Subcutaneous 2 times per day    aspirin  325 mg Oral Daily    amLODIPine  2.5 mg Oral Daily    escitalopram  10 mg Oral Daily    famotidine  20 mg Oral Daily    metoprolol succinate ER  25 mg Oral Daily    cetirizine  5 mg Oral Daily      Continuous Infusions:     Physical Exam:  General: Alert and oriented x 3. No apparent distress.   HEENT: Normocephalic, anicteric sclera, neck supple, no thyromegaly or adenopathy.  Neck: No JVD, carotids 2+, no bruits.  Cardiac: Regular rate and rhythm. S1, S2 normal. No murmur, pericardial rub, S3, or extra cardiac sounds.  Lungs: Clear without wheezes, rales, rhonchi or dullness.  Normal excursions and effort.  Abdomen: Soft, non-tender. No organosplenomegally, mass or rebound, BS-present.  Extremities: Without clubbing or cyanosis. No edema.    Neurologic: Alert and oriented, normal affect. No focal defects  Skin: Warm and dry.     Results:   Laboratory Data:  Lab Results   Component Value Date    WBC 7.3 12/30/2024    HGB 12.4 12/30/2024    HCT 37.2 12/30/2024    .0 12/30/2024    CREATSERUM 1.05 (H) 12/30/2024    BUN 19 12/30/2024     12/30/2024    K 3.6 12/30/2024     12/30/2024    CO2 27.0 12/30/2024    GLU 94 12/30/2024    CA 9.8 12/30/2024    ALB 4.1 12/30/2024    ALKPHO 72 12/30/2024    TP 6.6 12/30/2024    AST 17 12/30/2024    ALT 9 (L) 12/30/2024    PTT 25.7 04/28/2021    INR 0.97 04/28/2021    PTP 12.7 04/28/2021    DDIMER 0.45 12/30/2024    MG 2.3 04/28/2021    TROP <0.045 02/28/2019         Recent Labs   Lab 12/30/24  0934   GLU 94   BUN 19   CREATSERUM 1.05*   CA 9.8      K 3.6      CO2 27.0     Recent Labs   Lab 12/30/24  0934   RBC 3.91   HGB 12.4   HCT 37.2   MCV 95.1   MCH 31.7   MCHC 33.3   RDW 13.2   NEPRELIM 4.86   WBC 7.3   .0       No results for input(s): \"BNPML\" in the last 168 hours.    No results for input(s): \"TROP\", \"CK\" in the last 168 hours.    Imaging:  XR CHEST AP PORTABLE  (CPT=71045)    Result Date: 12/30/2024  CONCLUSION:   No new focal opacity, pleural effusion, or pneumothorax.  Minimal left perihilar and bibasilar atelectasis/scarring is not significantly changed.  Redemonstrated enlarged cardiomediastinal silhouette.  Moderate  hiatal hernia.    Dictated by (CST): Royal Lockwood MD on 12/30/2024 at 10:21 AM     Finalized by (CST): Royal Lockwood MD on 12/30/2024 at 10:22 AM           Thank you for allowing me to participate in the care of your patient.    Dave Padgett, DO  [facility]       [1]   Allergies  Allergen Reactions    Codeine UNKNOWN    Penicillins     Sulfites UNKNOWN    Tetracycline     Bacitracin-Polymyxin B RASH    Clindamycin RASH    Latex RASH

## 2024-12-30 NOTE — ED QUICK NOTES
Orders for admission, patient is aware of plan and ready to go upstairs. Any questions, please call ED RN Yohana at extension [phone].     Patient Covid vaccination status: Fully vaccinated     COVID Test Ordered in ED: None    COVID Suspicion at Admission: N/A    Running Infusions:  None    Mental Status/LOC at time of transport: AxOX2-3 at baseline     Other pertinent information:   CIWA score: N/A   NIH score:  N/A

## 2024-12-30 NOTE — ED PROVIDER NOTES
Patient Seen in: [facility] Emergency Department      History     Chief Complaint   Patient presents with    Chest Pain Angina     Stated Complaint: cp  x days    Subjective:   HPI      Patient presents the emergency department complaining of chest discomfort for the last 3 days.  She states she has midsternal chest pain which at times radiates to her arm and also had some pain in the \"small of her back\".  She states that last night she was unable to sleep.  There is no fever, cough or cold symptoms.  She has a history of coronary disease with a single stent placed.  She has a cardiologist at an outside institution but has not seen them for several years.  Her last evaluation for chest pain was nearly 8 years ago when she had an angiogram performed here.    Objective:     Past Medical History:    Cancer (HCC)    Coronary atherosclerosis    Esophageal reflux    Essential hypertension    High blood pressure    High cholesterol    Stroke (HCC)              Past Surgical History:   Procedure Laterality Date    Cataract extraction w/  intraocular lens implant Bilateral 2009    Dr. Carlos     Cath percutaneous  transluminal coronary angioplasty      Yag capsulotomy - ou - both eyes Bilateral     Dr. Carlos                Social History     Socioeconomic History    Marital status:    Tobacco Use    Smoking status: Never    Smokeless tobacco: Never   Substance and Sexual Activity    Alcohol use: No    Drug use: No     Social Drivers of Health     Financial Resource Strain: Low Risk  (11/9/2020)    Received from [facility]    Overall Financial Resource Strain (CARDIA)     Difficulty of Paying Living Expenses: Not very hard   Food Insecurity: No Food Insecurity (12/30/2024)    Food Insecurity     Food Insecurity: Never true   Transportation Needs: No Transportation Needs (12/30/2024)    Transportation Needs     Lack of Transportation: No   Housing Stability: Low Risk  (12/30/2024)     Housing Stability     Housing Instability: No                  Physical Exam     ED Triage Vitals   BP 12/30/24 0900 141/69   Pulse 12/30/24 0900 69   Resp 12/30/24 0900 18   Temp 12/30/24 0900 98 °F (36.7 °C)   Temp src 12/30/24 0900 Temporal   SpO2 12/30/24 0900 98 %   O2 Device 12/30/24 0945 None (Room air)       Current Vitals:   Vital Signs  BP: 132/66  Pulse: 60  Resp: 20  Temp: 98.6 °F (37 °C)  Temp src: Oral  MAP (mmHg): 86    Oxygen Therapy  SpO2: 95 %  O2 Device: None (Room air)        Physical Exam  Vitals and nursing note reviewed.   Constitutional:       General: She is not in acute distress.     Appearance: She is well-developed.   HENT:      Head: Normocephalic.      Nose: Nose normal.      Mouth/Throat:      Mouth: Mucous membranes are moist.   Eyes:      Conjunctiva/sclera: Conjunctivae normal.   Cardiovascular:      Rate and Rhythm: Normal rate and regular rhythm.      Heart sounds: No murmur heard.  Pulmonary:      Effort: Pulmonary effort is normal. No respiratory distress.      Breath sounds: Normal breath sounds.   Abdominal:      General: There is no distension.      Palpations: Abdomen is soft.      Tenderness: There is no abdominal tenderness.   Musculoskeletal:         General: No tenderness. Normal range of motion.      Cervical back: Normal range of motion and neck supple.   Skin:     General: Skin is warm and dry.      Capillary Refill: Capillary refill takes less than 2 seconds.      Findings: No rash.   Neurological:      General: No focal deficit present.      Mental Status: She is alert and oriented to person, place, and time.             ED Course     Labs Reviewed   CBC WITH DIFFERENTIAL WITH PLATELET - Abnormal; Notable for the following components:       Result Value    RDW-SD 46.5 (*)     All other components within normal limits   COMP METABOLIC PANEL (14) - Abnormal; Notable for the following components:    Creatinine 1.05 (*)     eGFR-Cr 50 (*)     ALT 9 (*)     All other  components within normal limits   TROPONIN I HIGH SENSITIVITY - Normal   D-DIMER - Normal   TROPONIN I HIGH SENSITIVITY - Normal   TROPONIN I HIGH SENSITIVITY - Normal   RAINBOW DRAW BLUE     EKG    Rate, intervals and axes as noted on EKG Report.  Rate: 65 bpm  Rhythm: Sinus Rhythm  Reading: Right bundle branch block, unchanged when compared to old EKG.                       [facility]          Admission disposition: 12/30/2024 10:45 AM           Medical Decision Making  Differential diagnosis considered for angina, PE, pleurisy, reflux.    Problems Addressed:  Chest pain of uncertain etiology: acute illness or injury    Amount and/or Complexity of Data Reviewed  Labs: ordered. Decision-making details documented in ED Course.     Details: Troponin, D-dimer, chemistry and CBC normal  Radiology: ordered and independent interpretation performed. Decision-making details documented in ED Course.     Details: Chest x-ray normal  ECG/medicine tests: ordered and independent interpretation performed. Decision-making details documented in ED Course.  Discussion of management or test interpretation with external provider(s): Patient last had an angiogram in 2017 which showed moderate disease.  In light of new symptoms today.  Will admit to hospital for further evaluation by cardiology.  Aspirin given in the emergency department.    Risk  OTC drugs.  Decision regarding hospitalization.        Disposition and Plan     Clinical Impression:  1. Chest pain of uncertain etiology         Disposition:  Admit  12/30/2024 10:45 am    Follow-up:  No follow-up provider specified.  We recommend that you schedule follow up care with a primary care provider within the next three months to obtain basic health screening including reassessment of your blood pressure.      Medications Prescribed:  Current Discharge Medication List              Supplementary Documentation:         Hospital Problems       Present on Admission  Date Reviewed: 5/20/2021             ICD-10-CM Noted POA    * (Principal) Chest pain of uncertain etiology R07.9 12/30/2024 Unknown    Angina pectoris (HCC) I20.9 12/30/2024 Unknown

## 2024-12-30 NOTE — PLAN OF CARE
Pt arrived from the ED to the floor. No complaints of chest pain at this time. Family at bedside. Plan for Echo. Alert and oriented. Frequent rounding by staff. Call light within reach. Safety precautions in place.   Problem: Patient Centered Care  Goal: Patient preferences are identified and integrated in the patient's plan of care  Description: Interventions:  - What would you like us to know as we care for you? From home   - Provide timely, complete, and accurate information to patient/family  - Incorporate patient and family knowledge, values, beliefs, and cultural backgrounds into the planning and delivery of care  - Encourage patient/family to participate in care and decision-making at the level they choose  - Honor patient and family perspectives and choices  Outcome: Progressing     Problem: Patient/Family Goals  Goal: Patient/Family Long Term Goal  Description: Patient's Long Term Goal: Discharge    Interventions:  - Assist with labs and tests  - See additional Care Plan goals for specific interventions  Outcome: Progressing  Goal: Patient/Family Short Term Goal  Description: Patient's Short Term Goal: Blood pressure control     Interventions:   - Medication administration   - See additional Care Plan goals for specific interventions  Outcome: Progressing

## 2024-12-31 VITALS
DIASTOLIC BLOOD PRESSURE: 72 MMHG | TEMPERATURE: 97 F | BODY MASS INDEX: 26.7 KG/M2 | SYSTOLIC BLOOD PRESSURE: 142 MMHG | RESPIRATION RATE: 18 BRPM | OXYGEN SATURATION: 93 % | HEIGHT: 60 IN | WEIGHT: 136 LBS | HEART RATE: 61 BPM

## 2024-12-31 LAB
ANION GAP SERPL CALC-SCNC: 7 MMOL/L (ref 0–18)
ATRIAL RATE: 65 BPM
BASOPHILS # BLD AUTO: 0.04 X10(3) UL (ref 0–0.2)
BASOPHILS NFR BLD AUTO: 0.6 %
BUN BLD-MCNC: 20 MG/DL (ref 9–23)
BUN/CREAT SERPL: 18.3 (ref 10–20)
CALCIUM BLD-MCNC: 9.1 MG/DL (ref 8.7–10.4)
CHLORIDE SERPL-SCNC: 107 MMOL/L (ref 98–112)
CO2 SERPL-SCNC: 28 MMOL/L (ref 21–32)
CREAT BLD-MCNC: 1.09 MG/DL
DEPRECATED RDW RBC AUTO: 46.6 FL (ref 35.1–46.3)
EGFRCR SERPLBLD CKD-EPI 2021: 48 ML/MIN/1.73M2 (ref 60–?)
EOSINOPHIL # BLD AUTO: 0.22 X10(3) UL (ref 0–0.7)
EOSINOPHIL NFR BLD AUTO: 3.4 %
ERYTHROCYTE [DISTWIDTH] IN BLOOD BY AUTOMATED COUNT: 13.2 % (ref 11–15)
GLUCOSE BLD-MCNC: 89 MG/DL (ref 70–99)
HCT VFR BLD AUTO: 34.9 %
HGB BLD-MCNC: 12.2 G/DL
IMM GRANULOCYTES # BLD AUTO: 0.01 X10(3) UL (ref 0–1)
IMM GRANULOCYTES NFR BLD: 0.2 %
LYMPHOCYTES # BLD AUTO: 1.48 X10(3) UL (ref 1–4)
LYMPHOCYTES NFR BLD AUTO: 23.1 %
MCH RBC QN AUTO: 33.2 PG (ref 26–34)
MCHC RBC AUTO-ENTMCNC: 35 G/DL (ref 31–37)
MCV RBC AUTO: 95.1 FL
MONOCYTES # BLD AUTO: 0.69 X10(3) UL (ref 0.1–1)
MONOCYTES NFR BLD AUTO: 10.8 %
NEUTROPHILS # BLD AUTO: 3.97 X10 (3) UL (ref 1.5–7.7)
NEUTROPHILS # BLD AUTO: 3.97 X10(3) UL (ref 1.5–7.7)
NEUTROPHILS NFR BLD AUTO: 61.9 %
OSMOLALITY SERPL CALC.SUM OF ELEC: 296 MOSM/KG (ref 275–295)
P AXIS: 86 DEGREES
P-R INTERVAL: 202 MS
PLATELET # BLD AUTO: 193 10(3)UL (ref 150–450)
POTASSIUM SERPL-SCNC: 3.9 MMOL/L (ref 3.5–5.1)
Q-T INTERVAL: 438 MS
QRS DURATION: 146 MS
QTC CALCULATION (BEZET): 455 MS
R AXIS: -60 DEGREES
RBC # BLD AUTO: 3.67 X10(6)UL
SODIUM SERPL-SCNC: 142 MMOL/L (ref 136–145)
T AXIS: 7 DEGREES
VENTRICULAR RATE: 65 BPM
WBC # BLD AUTO: 6.4 X10(3) UL (ref 4–11)

## 2024-12-31 PROCEDURE — 99239 HOSP IP/OBS DSCHRG MGMT >30: CPT | Performed by: INTERNAL MEDICINE

## 2024-12-31 RX ORDER — ASPIRIN 81 MG/1
81 TABLET ORAL DAILY
Status: DISCONTINUED | OUTPATIENT
Start: 2025-01-01 | End: 2024-12-31

## 2024-12-31 RX ORDER — AMLODIPINE BESYLATE 5 MG/1
5 TABLET ORAL DAILY
Status: DISCONTINUED | OUTPATIENT
Start: 2025-01-01 | End: 2024-12-31

## 2024-12-31 RX ORDER — METOPROLOL SUCCINATE 25 MG/1
12.5 TABLET, EXTENDED RELEASE ORAL DAILY
Qty: 30 TABLET | Refills: 3 | Status: SHIPPED | OUTPATIENT
Start: 2025-01-01

## 2024-12-31 RX ORDER — AMLODIPINE BESYLATE 5 MG/1
5 TABLET ORAL DAILY
Qty: 30 TABLET | Refills: 3 | Status: SHIPPED | OUTPATIENT
Start: 2025-01-01

## 2024-12-31 NOTE — PLAN OF CARE
Patient is from home w/ daughter. A&Ox4. Room air. Patient is x1w/ a walker. Family at bedside during rounds. Bed in lowest position and locked. Call light in hand. Personal belongings w/in reach. Patient is ready for discharge. IV removed. Tele box removed and returned. Nurse  cleared.     Problem: Patient Centered Care  Goal: Patient preferences are identified and integrated in the patient's plan of care  Description: Interventions:  - What would you like us to know as we care for you? From home w/ daughter  - Provide timely, complete, and accurate information to patient/family  - Incorporate patient and family knowledge, values, beliefs, and cultural backgrounds into the planning and delivery of care  - Encourage patient/family to participate in care and decision-making at the level they choose  - Honor patient and family perspectives and choices  Outcome: Adequate for Discharge     Problem: Patient/Family Goals  Goal: Patient/Family Long Term Goal  Description: Patient's Long Term Goal: To feel better    Interventions:  - Continue medication administration as ordered  - See additional Care Plan goals for specific interventions  Outcome: Adequate for Discharge  Goal: Patient/Family Short Term Goal  Description: Patient's Short Term Goal: To be discharged    Interventions:   - Assist patient to all tests and procedures  - See additional Care Plan goals for specific interventions  Outcome: Adequate for Discharge     Problem: CARDIOVASCULAR - ADULT  Goal: Maintains optimal cardiac output and hemodynamic stability  Description: INTERVENTIONS:  - Monitor vital signs, rhythm, and trends  - Monitor for bleeding, hypotension and signs of decreased cardiac output  - Evaluate effectiveness of vasoactive medications to optimize hemodynamic stability  - Monitor arterial and/or venous puncture sites for bleeding and/or hematoma  - Assess quality of pulses, skin color and temperature  - Assess for signs of decreased  coronary artery perfusion - ex. Angina  - Evaluate fluid balance, assess for edema, trend weights  Outcome: Adequate for Discharge  Goal: Absence of cardiac arrhythmias or at baseline  Description: INTERVENTIONS:  - Continuous cardiac monitoring, monitor vital signs, obtain 12 lead EKG if indicated  - Evaluate effectiveness of antiarrhythmic and heart rate control medications as ordered  - Initiate emergency measures for life threatening arrhythmias  - Monitor electrolytes and administer replacement therapy as ordered  Outcome: Adequate for Discharge

## 2024-12-31 NOTE — PROGRESS NOTES
[facility]  [facility]     Annalee E Mucha Patient Status:  Observation    1933 MRN [id number]   Location [facility] 3W/SW Attending Jennifer Hanley MD   Hosp Day # 0 PCP None Pcp     Subjective:     Patient laying in bed and in NAD. Reports chest pain is better than before.       Objective:    Review of Systems:   ROS completed; pertinent positive and negatives stated in subjective.      Vital signs:  Temp:  [97.2 °F (36.2 °C)-100 °F (37.8 °C)] 97.2 °F (36.2 °C)  Pulse:  [49-63] 61  Resp:  [18-20] 18  BP: (132-158)/(61-72) 142/72  SpO2:  [92 %-95 %] 93 %      Physical Exam:    Gen: NAD AO x3  Chest: good air entry CTABL  CVS: normal s1 and s2 RR  Abd: NABS soft NT ND  Neuro: CN 2-12 grossly intact  Ext: no edema in bilateral LE      Diagnostic Data:    Labs:  Recent Labs   Lab 24  0934 24  0650   WBC 7.3 6.4   HGB 12.4 12.2   MCV 95.1 95.1   .0 193.0       Recent Labs   Lab 24  0934 24  0650   GLU 94 89   BUN 19 20   CREATSERUM 1.05* 1.09*   CA 9.8 9.1   ALB 4.1  --     142   K 3.6 3.9    107   CO2 27.0 28.0   ALKPHO 72  --    AST 17  --    ALT 9*  --    BILT 0.3  --    TP 6.6  --        Estimated Creatinine Clearance: 24.1 mL/min (A) (based on SCr of 1.09 mg/dL (H)).    No results for input(s): \"PTP\", \"INR\" in the last 168 hours.           Imaging: Imaging data reviewed in Epic.    Medications:    [START ON 2025] metoprolol succinate ER  12.5 mg Oral Daily    [START ON 2025] aspirin  81 mg Oral Daily    [START ON 2025] amLODIPine  5 mg Oral Daily    heparin  5,000 Units Subcutaneous 2 times per day    escitalopram  10 mg Oral Daily    famotidine  20 mg Oral Daily    cetirizine  5 mg Oral Daily       Assessment & Plan:     Chest pain  CAD s/p PCI to LAD in 2017  EKG reviewed  Trop WNL, Dimer negative  Echo reviewed  Cardiology on consult  Continue ASA, BB  HTN  Sinus bradycardia/Chronic  RBBB  BP well controlled at this time  Cardiology on consult  Amlodipine increased to 5 mg daily, Toprol XL decreased to 12.5 mg daily  GERD  OA  CKD      Plan of care discussed with patient or family at bedside.      Supplementary Documentation:     Quality:  DVT Prophylaxis: Heparin s/c  CODE status: Full       Estimated date of discharge: TBD  Discharge is dependent on: clinical stability  At this point Ms. Mucha is expected to be discharge to: home                   Jennifer Hanley MD  Hospitalist    MDM: High, I personally reviewed the available laboratories, imaging. I discussed the case with RN. I ordered laboratories, studies including AM labs.   Medical decision making high, risk is high.       The 21st Century Cures Act makes medical notes like these available to patients in the interest of transparency. Please be advised this is a medical document. Medical documents are intended to carry relevant information, facts as evident, and the clinical opinion of the practitioner. The medical note is intended as peer to peer communication and may appear blunt or direct. It is written in medical language and may contain abbreviations or verbiage that are unfamiliar.

## 2024-12-31 NOTE — PROGRESS NOTES
Progress Note  Annalee E Mucha Patient Status:  Observation    1933 MRN [id number]   Location [facility] 3W/SW Attending Stefania Garcia MD   Hosp Day # 0 PCP None Pcp     SUBJECTIVE:    Has not experienced chest pain since prior to admission. Denies dyspnea or palpations.     VITALS:  /61 (BP Location: Right arm)   Pulse 61   Temp 98 °F (36.7 °C) (Oral)   Resp 20   Ht 5' (1.524 m)   Wt 136 lb (61.7 kg)   SpO2 92%   BMI 26.56 kg/m²   INTAKE/OUTPUT:    Intake/Output Summary (Last 24 hours) at 2024 0839  Last data filed at 2024 0837  Gross per 24 hour   Intake 540 ml   Output --   Net 540 ml     Last 3 Weights   24 0519 136 lb (61.7 kg)   24 1304 131 lb 11.2 oz (59.7 kg)   10/16/22 1131 135 lb (61.2 kg)   08/10/22 1205 139 lb (63 kg)     LABS:  Recent Labs   Lab 24  0934 24  0650   GLU 94 89   BUN 19 20   CREATSERUM 1.05* 1.09*   EGFRCR 50* 48*   CA 9.8 9.1    142   K 3.6 3.9    107   CO2 27.0 28.0     Recent Labs   Lab 24  0934 24  0650   RBC 3.91 3.67*   HGB 12.4 12.2   HCT 37.2 34.9*   MCV 95.1 95.1   MCH 31.7 33.2   MCHC 33.3 35.0   RDW 13.2 13.2   NEPRELIM 4.86 3.97   WBC 7.3 6.4   .0 193.0     No results for input(s): \"TROP\", \"CK\" in the last 168 hours.  DIAGNOSTICS:  TELEMETRY: SB HR 50-60s    Conclusions:   1. Left ventricle: The cavity size was normal. Wall thickness was normal.      Systolic function was normal. The estimated ejection fraction was 55-60%,      by visual assessment. No diagnostic evidence for regional wall motion      abnormalities. Features are consistent with a pseudonormal left      ventricular filling pattern, with concomitant abnormal relaxation and      increased filling pressure - grade 2 diastolic dysfunction.   2. Left atrium: The left atrial volume was mildly increased.   3. Aortic valve: There was moderate regurgitation. The effective regurgitant      orifice (PISA) was 0.19cm^2. The  regurgitant volume (PISA) was 48ml.   4. Aortic root: The aortic root was at the upper limits of normal.   5. Ascending aorta: The ascending aorta was dilated and 3.8cm diameter.   6. Mitral valve: There was mild regurgitation.   7. Pulmonary arteries: Systolic pressure was mildly increased, estimated to      be 38mm Hg.   8. Pericardium, extracardiac: A trivial pericardial effusion was identified.     ROS: Negative unless noted above   PHYSICAL EXAM:  General: Alert and oriented x 3. No apparent distress.  HEENT: Normocephalic, sclera are nonicteric. Hearing decreased bilaterally.  Neck: No JVD or Carotid bruits. Trachea midline.   Cardiac: Regular rate and rhythm. S1, S2 auscultated. No murmurs, rubs, or gallops appreciated.   Lungs: Clear without wheezes, rales, rhonchi or dullness. Chest expansion symmetrical. Regular effort.  Abdomen: Soft, non-tender, +BS. No hepatosplenomegaly or appreciable masses.   Extremities: Without clubbing, cyanosis. Peripheral pulses are 2+. Edema   Neurologic: Motor and sensory nerves grossly intact.   Psych: Appropriate affect   Skin: Warm and dry. No obvious lesions, wounds, or ulcerations.   MEDICATIONS:   heparin  5,000 Units Subcutaneous 2 times per day    aspirin  325 mg Oral Daily    amLODIPine  2.5 mg Oral Daily    escitalopram  10 mg Oral Daily    famotidine  20 mg Oral Daily    metoprolol succinate ER  25 mg Oral Daily    cetirizine  5 mg Oral Daily     ASSESSMENT:    Presented after she had woken up from sleep with chest pressure. It remained intermittent throughout the night and had resolved by morning without intervention.     Chest Pressure   CAD, PCI LAD 2017  - EKG without acute ST-T changes, troponin negative x3, Dimer negative  - ECHO with preserved ejection fraction, no WMA, Mod AI  - Full dose aspirin, BB, not historically on statin. Not on ACE/ARB/ARNI with CKD     HTN  - Suboptimal on Amlodipine 2.5 mg and Toprol XL     Sinus Bradycardia/Chronic RBBB  - Toprol  XL 25 mg held this morning, rates low 50s    GERD- Pepcid   Osteoarthritis   CKD II- Stable   SHAHNAZ?    PLAN:  - Cardiac workup has been negative thus far. Unclear if pressure was related to Gerd vs undiagnosed sleep apnea.  If patient ambulates and is without ischemic symptoms she can be discharged from a cardiac standpoint. Discussed outpatient SHAHNAZ testing and nuclear testing with patient and family. They would like to pursue conservative measures. If they would like to follow up with further testing they will return to [place] PCP. I let them know that our office information will be placed in the discharge paperwork in case they change their mind.   - Decrease Toprol XL to 12.5 mg and increase Amlodipine to 5 mg       Plan of care discussed with patient and RN.     Vani Bach, MSN, FNP-BC, CCK  12/31/24   8:39 AM  [phone] [place]  [phone] [place]

## 2024-12-31 NOTE — DISCHARGE SUMMARY
[facility]  part of [facility]    DISCHARGE SUMMARY     Annalee E Mucha Patient Status:  Observation    1933 MRN [id number]   Location [facility] 3W/SW Attending Jennifer Hanley MD   Hosp Day # 0 PCP None Pcp     Date of Admission: 2024  Date of Discharge:  2024    Discharge Disposition: Home or Self Care    Discharge Diagnosis:     Chest pain  CAD s/p PCI to LAD in 2017  HTN  Sinus bradycardia/Chronic RBBB  GERD  OA  CKD    History of Present Illness:     Patient is a 89-year-old  female with known underlying coronary artery disease, presented today to the emergency department for evaluation of midsternal chest tightness associated with left arm pain intermittently overnight. In the emergency room, EKG showed right bundle-branch block which is chronic. First troponin, CBC, chemistry, and chest x-ray were unremarkable.     Brief Synopsis:     Chest pain  CAD s/p PCI to LAD in 2017  EKG reviewed  Trop WNL, Dimer negative  Echo reviewed  Cardiology on consult  Okay to discharge  HTN  Sinus bradycardia/Chronic RBBB  BP well controlled at this time  Cardiology on consult  Amlodipine increased to 5 mg daily, Toprol XL decreased to 12.5 mg daily  Cleared for discharge  Close opt follow up  GERD  OA  CKD  Patient will follow up with PCP and Cardiology as opt. Discharge meds ordered by cardiology.  Patient is to remain compliant with all discharge medications and instructions and to follow up as advised.   Patient encouraged to make healthy lifestyle and dietary changes.    Lace+ Score: 49  59-90 High Risk  29-58 Medium Risk  0-28   Low Risk       TCM Follow-Up Recommendation:  LACE 29-58: Moderate Risk of readmission after discharge from the hospital.      Procedures during hospitalization:   None    Incidental or significant findings and recommendations (brief descriptions):  None    Lab/Test results pending at Discharge:    None    Consultants:  Cardiology    Discharge Medication List:     Discharge Medications        CHANGE how you take these medications        Instructions Prescription details   amLODIPine 5 MG Tabs  Commonly known as: Norvasc  Start taking on: January 1, 2025  What changed: how much to take      Take 1 tablet (5 mg total) by mouth daily.   Quantity: 30 tablet  Refills: 3     metoprolol succinate ER 25 MG Tb24  Commonly known as: Toprol XL  Start taking on: January 1, 2025  What changed: how much to take      Take 0.5 tablets (12.5 mg total) by mouth daily.   Quantity: 30 tablet  Refills: 3            CONTINUE taking these medications        Instructions Prescription details   aspirin 81 MG Tbec      Take 1 tablet (81 mg total) by mouth daily.   Refills: 0     cyanocobalamin 1000 MCG Tabs  Commonly known as: Vitamin B12      Take 1 tablet (1,000 mcg total) by mouth daily.   Refills: 0     escitalopram 5 MG Tabs  Commonly known as: Lexapro      Take 2 tablets (10 mg total) by mouth daily.   Refills: 0     famotidine 20 MG Tabs  Commonly known as: Pepcid      Take 1 tablet (20 mg total) by mouth daily.   Refills: 0     loratadine 10 MG Tabs  Commonly known as: Claritin      Take 1 tablet (10 mg total) by mouth daily.   Refills: 0     SLOW FE OR      Take 40 mg/kg of iron by mouth daily.   Refills: 0     Vitamin D 50 MCG (2000 UT) Caps      Take 1 capsule (2,000 Units total) by mouth daily.   Refills: 0               Where to Get Your Medications        These medications were sent to [facility]/pharmacy #[id number] - [place], [place] - [address] AT [facility] ([place], [phone], [phone]  [address] [facility], [facility] [address]      Hours: 24-hours Phone: [phone]   amLODIPine 5 MG Tabs  metoprolol succinate ER 25 MG Tb24         [place]    Follow-up appointment:   MARILU CHAVIRA  [address]  [facility] [address] [phone]  Call  If you decide to puruse cardiology care with our  group    Pcp, None  [facility] [id number]    Follow up in 1 week(s)      Appointments for Next 30 Days 12/31/2024 - 1/30/2025      None            Vital signs:  Temp:  [97.2 °F (36.2 °C)-100 °F (37.8 °C)] 97.2 °F (36.2 °C)  Pulse:  [49-61] 61  Resp:  [18-20] 18  BP: (132-158)/(61-72) 142/72  SpO2:  [92 %-95 %] 93 %    Physical Exam:    Gen: NAD AO x3  Chest: good air entry CTABL  CVS: normal s1 and s2 RR  Abd: NABS soft NT ND  Neuro: CN 2-12 grossly intact  Ext: no edema in bilateral LE    -----------------------------------------------------------------------------------------------  PATIENT DISCHARGE INSTRUCTIONS: See electronic chart    Jennifer Hanley MD  Hospitalist    Time spent:  > 30 minutes    The 21st Century Cures Act makes medical notes like these available to patients in the interest of transparency. Please be advised this is a medical document. Medical documents are intended to carry relevant information, facts as evident, and the clinical opinion of the practitioner. The medical note is intended as peer to peer communication and may appear blunt or direct. It is written in medical language and may contain abbreviations or verbiage that are unfamiliar.

## 2024-12-31 NOTE — PLAN OF CARE
Problem: Patient Centered Care  Goal: Patient preferences are identified and integrated in the patient's plan of care  Description: Interventions:  - What would you like us to know as we care for you? Lives with Daughter  - Provide timely, complete, and accurate information to patient/family  - Incorporate patient and family knowledge, values, beliefs, and cultural backgrounds into the planning and delivery of care  - Encourage patient/family to participate in care and decision-making at the level they choose  - Honor patient and family perspectives and choices  12/31/2024 0521 by Marva Guillen RN  Outcome: Progressing  12/31/2024 0521 by Marva Guillen, RN  Outcome: Progressing     Problem: CARDIOVASCULAR - ADULT  Goal: Maintains optimal cardiac output and hemodynamic stability  Description: INTERVENTIONS:  - Monitor vital signs, rhythm, and trends  - Monitor for bleeding, hypotension and signs of decreased cardiac output  - Evaluate effectiveness of vasoactive medications to optimize hemodynamic stability  - Monitor arterial and/or venous puncture sites for bleeding and/or hematoma  - Assess quality of pulses, skin color and temperature  - Assess for signs of decreased coronary artery perfusion - ex. Angina  - Evaluate fluid balance, assess for edema, trend weights  Outcome: Progressing  Goal: Absence of cardiac arrhythmias or at baseline  Description: INTERVENTIONS:  - Continuous cardiac monitoring, monitor vital signs, obtain 12 lead EKG if indicated  - Evaluate effectiveness of antiarrhythmic and heart rate control medications as ordered  - Initiate emergency measures for life threatening arrhythmias  - Monitor electrolytes and administer replacement therapy as ordered  Outcome: Progressing

## 2025-01-29 ENCOUNTER — APPOINTMENT (OUTPATIENT)
Dept: ULTRASOUND IMAGING | Facility: HOSPITAL | Age: OVER 89
End: 2025-01-29
Attending: EMERGENCY MEDICINE
Payer: MEDICARE

## 2025-01-29 ENCOUNTER — APPOINTMENT (OUTPATIENT)
Dept: GENERAL RADIOLOGY | Facility: HOSPITAL | Age: OVER 89
End: 2025-01-29
Attending: EMERGENCY MEDICINE
Payer: MEDICARE

## 2025-01-29 ENCOUNTER — HOSPITAL ENCOUNTER (EMERGENCY)
Facility: HOSPITAL | Age: OVER 89
Discharge: HOME | End: 2025-01-29
Attending: EMERGENCY MEDICINE
Payer: MEDICARE

## 2025-01-29 VITALS
SYSTOLIC BLOOD PRESSURE: 132 MMHG | HEART RATE: 64 BPM | RESPIRATION RATE: 18 BRPM | WEIGHT: 130 LBS | TEMPERATURE: 99 F | OXYGEN SATURATION: 99 % | BODY MASS INDEX: 25 KG/M2 | DIASTOLIC BLOOD PRESSURE: 70 MMHG

## 2025-01-29 DIAGNOSIS — M25.561 ACUTE PAIN OF RIGHT KNEE: Primary | ICD-10-CM

## 2025-01-29 DIAGNOSIS — M71.21 BAKER'S CYST OF KNEE, RIGHT: ICD-10-CM

## 2025-01-29 PROCEDURE — 93971 EXTREMITY STUDY: CPT | Performed by: EMERGENCY MEDICINE

## 2025-01-29 PROCEDURE — 73560 X-RAY EXAM OF KNEE 1 OR 2: CPT | Performed by: EMERGENCY MEDICINE

## 2025-01-29 PROCEDURE — 99284 EMERGENCY DEPT VISIT MOD MDM: CPT

## 2025-01-29 RX ORDER — ACETAMINOPHEN 325 MG/1
650 TABLET ORAL EVERY 6 HOURS PRN
Qty: 30 TABLET | Refills: 0 | Status: SHIPPED | OUTPATIENT
Start: 2025-01-29

## 2025-01-29 RX ORDER — ACETAMINOPHEN 500 MG
1000 TABLET ORAL ONCE
Status: COMPLETED | OUTPATIENT
Start: 2025-01-29 | End: 2025-01-29

## 2025-01-29 RX ORDER — IBUPROFEN 600 MG/1
600 TABLET, FILM COATED ORAL EVERY 6 HOURS PRN
Qty: 28 TABLET | Refills: 0 | Status: SHIPPED | OUTPATIENT
Start: 2025-01-29 | End: 2025-02-05

## 2025-01-29 RX ORDER — LIDOCAINE 50 MG/G
1 PATCH TOPICAL EVERY 24 HOURS
Qty: 14 PATCH | Refills: 0 | Status: SHIPPED | OUTPATIENT
Start: 2025-01-29

## 2025-01-29 NOTE — ED INITIAL ASSESSMENT (HPI)
Patient  to ED with complaint of right leg pain and edema that started today denies injury.       Patient is AXOX4.

## 2025-01-29 NOTE — DISCHARGE INSTRUCTIONS
Thank you for seeking care at [facility] Emergency Department.  You have been seen and evaluated.  The x-ray did not show any broken bones.  The ultrasound did not show a blood clot but did show a Baker's cyst.  Please follow-up with orthopedics regarding this finding.  Apply Ace wrap use Tylenol Motrin as needed for pain, and complete gentle range of motion and activity as tolerated   We discussed the results of your workup   Please read the instructions provided   If given prescriptions, take as instructed    Remember, your care process does not end after your visit today. Please follow-up with your doctor within 1-2 days for a follow-up check to ensure you are  improving, to see if you need any further evaluation/testing, or to evaluate for any alternate diagnoses.     Please note that taking ibuprofen along with escitalopram or other SSRIs can cause stomach upset and gastritis and ulcers.  Please make sure you take ibuprofen with food and do not take it more often than needed.    Please return to the emergency department if you develop chest pain, difficulty breathing, inability to drink liquids without vomiting, one sided numbness or weakness, slurred speech, severe headache, or if you develop any other new or concerning symptoms as these could be signs of more serious medical illness.    We hope you feel better.

## 2025-01-29 NOTE — ED PROVIDER NOTES
[facility] Emergency Department Note  Patient: Annalee E Mucha Age: 89 year old Sex: female      MRN: [id number]  : 1933    Patient Seen in: [facility] Emergency Department    History     Chief Complaint   Patient presents with    Pain     Right leg     Stated Complaint: R Leg Pain/Edema    History obtained from: patient     89 year old female hx of CAD, HTN, HLD, CVA in the past, GERD, here with atraumatic R knee pain and R leg swelling.  Patient states that over the past couple of days she has noticed some mild swelling to right lower leg.  States that earlier today she took a step and felt a \"pop\" in the back of her right knee and has since had worsening pain.  Took Motrin this morning though continues to have pain.  No recent travel.  No history of blood clots.  No chest pain, shortness of breath, fevers or rash.  Pain is worse with certain movements.  No numbness or tingling in her right leg.  No other complaints at this time.    Review of Systems:  Review of Systems  Positive for stated complaint: R Leg Pain/Edema. Constitutional and vital signs reviewed. All other systems reviewed and negative except as noted above.    Patient History:  Past Medical History:    Cancer (HCC)    Coronary atherosclerosis    Esophageal reflux    Essential hypertension    High blood pressure    High cholesterol    Stroke (HCC)       Past Surgical History:   Procedure Laterality Date    Cataract extraction w/  intraocular lens implant Bilateral     Dr. Carlos     Cath percutaneous  transluminal coronary angioplasty      Yag capsulotomy - ou - both eyes Bilateral     Dr. Carlos        Family History   Problem Relation Age of Onset    Glaucoma Mother     Cataracts Mother     Macular degeneration Mother     Cataracts Sister     Diabetes Neg        Specific Social Determinants of Health:   Social History     Socioeconomic History    Marital status:    Tobacco Use    Smoking status: Never    Smokeless tobacco:  Never   Substance and Sexual Activity    Alcohol use: No    Drug use: No     Social Drivers of Health     Financial Resource Strain: Low Risk  (11/9/2020)    Received from [facility]    Overall Financial Resource Strain (CARDIA)     Difficulty of Paying Living Expenses: Not very hard   Food Insecurity: No Food Insecurity (12/30/2024)    Food Insecurity     Food Insecurity: Never true   Transportation Needs: No Transportation Needs (12/30/2024)    Transportation Needs     Lack of Transportation: No   Housing Stability: Low Risk  (12/30/2024)    Housing Stability     Housing Instability: No           PSFH elements reviewed from today and agreed except as otherwise stated in HPI.    Physical Exam     ED Triage Vitals [01/29/25 1006]   /66   Pulse 60   Resp 16   Temp 98.5 °F (36.9 °C)   Temp src Temporal   SpO2 100 %   O2 Device None (Room air)       Current:/66   Pulse 60   Temp 98.5 °F (36.9 °C) (Temporal)   Resp 16   Wt 59 kg   SpO2 100%   BMI 25.39 kg/m²         Physical Exam  Vitals and nursing note reviewed.   Constitutional:       General: She is not in acute distress.     Appearance: She is not ill-appearing.   HENT:      Head: Normocephalic and atraumatic.      Right Ear: External ear normal.      Left Ear: External ear normal.      Nose: Nose normal.      Mouth/Throat:      Mouth: Mucous membranes are moist.   Eyes:      Conjunctiva/sclera: Conjunctivae normal.   Cardiovascular:      Rate and Rhythm: Normal rate and regular rhythm.      Comments: +2 dp pulse bilaterally   Pulmonary:      Effort: Pulmonary effort is normal. No respiratory distress.   Abdominal:      General: There is no distension.   Musculoskeletal:         General: No deformity.      Comments: There is trace edema in the right lower leg that is nonpitting.  There is no reproducible tenderness throughout the anterior posterior joint line of right knee.  It is stable to varus and valgus.  Negative  anterior posterior drawer.  Patient is able to fully flex and extend the right knee but elicits pain.  She has no pain on right hip flexion and no tenderness to the right hip or thigh, no tenderness of the right calf, shin, ankle, or foot with intact preserve range of motion to the right hip and right ankle joints. Compartments soft in RLE throughout    Skin:     General: Skin is warm and dry.      Capillary Refill: Capillary refill takes less than 2 seconds.   Neurological:      General: No focal deficit present.      Mental Status: She is alert.      Comments: Strength 5/5 right hip flexion, right knee flexion and extension and ankle plantar and dorsiflexion.  Sensation tact light touch symmetric bilateral lower extremities.         ED Course   Labs:   Labs Reviewed - No data to display  Radiology findings:  I personally reviewed the images.   US VENOUS DOPPLER LEG RIGHT - DIAG IMG (CPT=93971)    Result Date: 1/29/2025  CONCLUSION: No evidence of right lower extremity DVT.  Small unruptured Baker's cyst behind the right knee.   Dictated by (CST): Mc Granados MD on 1/29/2025 at 12:29 PM     Finalized by (CST): Mc Granados MD on 1/29/2025 at 12:30 PM               MDM   89 year old female hx of CAD, HTN, HLD, CVA in the past, GERD, here with atraumatic R knee pain and R leg swelling. Distally NVI. In no distress. Exam as above.     Differential diagnoses considered includes, but is not limited to:   Dvt  Osteoarthritis  Bakers cyst  Ligamentous injury   Meniscus tear  Clinically low suspicion septic or gouty arthritis given lack of fever or decreased joint ROM     Will obtain the following tests: XR R knee, DVT US from triage   Will administer the following medications/therapies: tylenol, lido patch, ace wrap     Please see ED course for my independent review of these tests/imaging results.    Chronic conditions affecting care: CAD, HTN, HLD, CVA     Workup and medications considered but not ordered: labs  including cbc, bmp, esr, crp     Social Determinants of Health that impacted care: n/a     ED Course as of 01/29/25 1416  ------------------------------------------------------------  Time: 01/29 1340  Comment:   THROMBI: None visible.  COMPRESSIBILITY: Normal.  OTHER: There is a lobulated 25 x 6 x 22-mm hypoechoic focus that has increased through transmission and thin/imperceptible wall located in the posterior-medial aspect of the right knee compatible with an unruptured Baker's cyst..              Impression  CONCLUSION: No evidence of right lower extremity DVT.  Small unruptured Baker's cyst behind the right knee.        Discussed findings with pt and her daughter and treatment of bakers cyst.   ------------------------------------------------------------  Time: 01/29 1411  Value: XR KNEE (1 OR 2 VIEWS), RIGHT (CPT=73560)  Comment:   TECHNIQUE: Two views   Findings and impression:  Normal alignment with no fracture     ------------------------------------------------------------  Time: 01/29 1411  Comment: Patient and daughter updated with results.  Discussed with him that she has normal alignment without signs of arthritis on her knee x-ray and symptoms are likely secondary to Baker's cyst however it is important for her to continue with supportive cares, gentle range of motion and activity as tolerated and follow-up with orthopedics.  Return if new or worsening symptoms occur immediately.  They are comfortable with discharge plan at this time            Procedures:  Procedures        Disposition and Plan     Clinical Impression:  1. Acute pain of right knee    2. Baker's cyst of knee, right        Disposition:  Discharge    Follow-up:  Lani Vaughan MD  [address] [place] [address]  [phone]    Schedule an appointment as soon as possible for a visit in 2 day(s)      Jaison, MD Feng  [address] [facility] [address]  [facility] [address]  [phone]    Schedule an  appointment as soon as possible for a visit in 1 week(s)      [facility] Emergency Department  [address]  [facility] [id number] [phone]  Go to  If symptoms worsen, immediately      Medications Prescribed:  Current Discharge Medication List        START taking these medications    Details   ibuprofen 600 MG Oral Tab Take 1 tablet (600 mg total) by mouth every 6 (six) hours as needed.  Qty: 28 tablet, Refills: 0      acetaminophen (TYLENOL) 325 MG Oral Tab Take 2 tablets (650 mg total) by mouth every 6 (six) hours as needed.  Qty: 30 tablet, Refills: 0      lidocaine 5 % External Patch Place 1 patch onto the skin daily.  Qty: 14 patch, Refills: 0               This note may have been created using voice dictation technology and may include inadvertent errors.      Ariadna Magallanes DO  Attending Physician   Emergency Medicine

## (undated) DEVICE — Device: Brand: CUSTOM PROCEDURE KIT

## (undated) DEVICE — MEDI-VAC NON-CONDUCTIVE SUCTION TUBING 6MM X 1.8M (6FT.) L: Brand: CARDINAL HEALTH

## (undated) DEVICE — FORCEP RADIAL JAW 4

## (undated) DEVICE — LINE MNTR ADLT SET O2 INTMD

## (undated) DEVICE — Device: Brand: DEFENDO AIR/WATER/SUCTION AND BIOPSY VALVE

## (undated) NOTE — LETTER
5/16/2019    Patient: Tilford Boas   MR Number: [id number]   YOB: 1933   Date of Visit: 5/16/2019   Physician: Abhishek Sherman MD     Dear Medicare Patient:  Charli River TO BENEFICIARY:  Please know that while a refraction is i

## (undated) NOTE — LETTER
May 16, 6392    Abdi Mora MD  [address]     Patient: Miko Pakr   YOB: 1933   Date of Visit: 5/16/2019       Dear Dr. Concepcion Aly MD:    Thank you for referring Sallie Sargent to me for evaluation.  Her raNITIdine HCl 150 MG Oral Cap Take 150 mg by mouth daily. Disp:  Rfl:    Metoprolol Succinate ER 25 MG Oral Tablet 24 Hr Take 25 mg by mouth daily. Disp:  Rfl:    atorvastatin 40 MG Oral Tab Take 40 mg by mouth daily.    Disp:  Rfl:    LISINOPRIL OR Take 4 Vessels Normal Normal    Periphery Normal Normal            Lacrimal Exam     Lacrimal       Right Left    Puncta LL Ectropion Normal            Refraction     Wearing Rx       Sphere Cylinder Axis Add    Right +0.25 +0.25 070 +2.75    Left -1.00 +1.00 11

## (undated) NOTE — ED AVS SNAPSHOT
[facility] in [address] [address] Umer Salgado    Phone:  [phone]    Fax:  [phone]   MRN: [id number]    Department:  [facility] in [address] [place] [address]   Date of Visit:  3 aspect of your visit today. In an effort to constantly improve our service to you, we would appreciate any positive or negative feedback related to the care you received in our Immediate Care. Please call our [address] at [phone].   Your Immediate contact you. Please make sure we have your correct phone number on file.      OUR CURRENT HOURS OF OPERATION:  [facility]  WEEKENDS AND HOLIDAYS 8AM - 6PM    I certified that I have received a copy of the aftercare instructions; that t [employer] will allow you to access patient instructions from your recent visit,  view other health information, and more. To sign up or find more information, go to [email or web address] and click on the Sign Up Now link in the [employer] box.      Enter

## (undated) NOTE — LETTER
Hospital Discharge Documentation  Please phone to schedule a hospital follow up appointment. No discharge summary available at this time, below is the most recent progress note  for your review .       From: [address] Hospitalist's Office  Phone: [phone] • High cholesterol     • Stroke [facility])              Past Surgical History:   Procedure Laterality Date   • CATH PERCUTANEOUS  TRANSLUMINAL CORONARY ANGIOPLASTY          History reviewed. No pertinent family history.   Social History:  Social History    Tobac temperature source Oral, resp. rate 20, height 5' (1.524 m), weight 138 lb (62.6 kg), SpO2 97 %.      General appearance: alert, appears stated age and cooperative  Head: Normocephalic, without obvious abnormality, atraumatic  Eyes: conjunctivae/corneas karlene CONCLUSION:  1. No acute CT abnormality. 2.  Post cholecystectomy. There is mild intrahepatic biliary ductal dilatation without significant extrahepatic biliary ductal dilatation suggesting chronic age related and post cholecystectomy changes.  3.  Post h

## (undated) NOTE — LETTER
5/16/2019    Patient: Bienvenido Elkins   MR Number: [id number]   YOB: 1933   Date of Visit: 5/16/2019   Physician: Any Glaser MD     Dear Medicare Patient:  Rosemaryjason Emily TO BENEFICIARY:  Please know that while a refraction is i

## (undated) NOTE — LETTER
Hospital Discharge Documentation  Please phone to schedule a hospital follow up appointment.     From: [address] Hospitalist's Office  Phone: [phone]    Patient discharged time/date: 2/28/2019  2:22 PM  Patient discharge disposition:  Home or Self female with known past medical history of coronary artery disease who presented to the emergency department for evaluation of chest pain that started while she was watching TV earlier today. EKG showed right bundle branch block which is chronic.   Troponin

## (undated) NOTE — LETTER
[address], [place]  Authorization for Invasive Procedures  1.  I hereby authorize Dr. Cardona Him , my physician and whomever may be designated as the doctor's assistant, to perform the following operation and/or procedure:  FLEXIBLE SI and allergic reactions, hemolytic reactions, transmission of disease such as hepatitis, AIDS, cytomegalovirus (CMV), and flluid overload.  In the event that I wish to have autologous transfusions of my own blood, or a directed donor transfusion, I will disc Signature of Patient:  ________________________________________________ Date: _________Time: _________    Responsible person in case of minor or unconscious: _____________________________Relationship: ____________     Witness Signature: ___________________

## (undated) NOTE — LETTER
[facility]  Administration of Anesthesia  1. Ruth Rasmussen, or _________________________________ acting on her behalf, (Patient) (Dependent/Representative) request to receive anesthesia for my pending procedure/operation/treatment.   A bleeding, seizure, cardiac arrest and death. 7. AWARENESS: I understand that it is possible (but unlikely) to have explicit memory of events from the operating room while under general anesthesia.   8. ELECTROCONVULSIVE THERAPY PATIENTS: This consent serve below affirms that prior to the time of the procedure, I have explained to the patient and/or his/her guardian, the risks and benefits of undergoing anesthesia, as well as any reasonable alternatives.     ___________________________________________________

## (undated) NOTE — ED AVS SNAPSHOT
Waqas Mckeon   MRN: [id number]    Department:  [facility] Emergency Department   Date of Visit:  6/24/2018           Disclosure     Insurance plans vary and the physician(s) referred by the ER may not be covered by your plan.  Please contact within the next three months to obtain basic health screening including reassessment of your blood pressure.     IF THERE IS ANY CHANGE OR WORSENING OF YOUR CONDITION, CALL YOUR PRIMARY CARE PHYSICIAN AT ONCE OR RETURN IMMEDIATELY TO THE EMERGENCY DEPARTMEN

## (undated) NOTE — LETTER
Hospital Discharge Documentation  Please phone to schedule a hospital follow up appointment.     From: [address] Hospitalist's Office  Phone: [phone]    Patient discharged time/date: 4/30/2021  1:44 PM  Patient discharge disposition:  [address] acute focal deficits  MSK: Full range of motion in extremities  Skin: Warm and dry  Psych: Normal affect  Ext: no c/c/e    History of Present Illness:  This is a very pleasant 59-year-old woman with a past medical history of coronary artery disease, stroke, Enterocolitis, blood per rectum   CT scan reviewed  Send stool for cdiff and GI panel- negative  GI foll[VS.1]owing[VS.3]  Flex sig 4/29[VS.1]- internal hemorrhoids, mod colitis, biopsies taken[VS.3]   Continue PPI    BOB  - resolved with hydration     CAD Refills: 0     Vitamin D 50 MCG (2000 UT) Caps      Take by mouth daily.    Refills: 0        STOP taking these medications    amLODIPine Besylate 2.5 MG Tabs  Commonly known as: [address] [facility]        LISINOPRIL OR                   Discharge Instructions       Ho

## (undated) NOTE — LETTER
[facility]  Administration of Anesthesia  1. Pia Drummond, or _________________________________ acting on her behalf, (Patient) (Dependent/Representative) request to receive anesthesia for my pending procedure/operation/treatment.   A bleeding, seizure, cardiac arrest and death. 7. AWARENESS: I understand that it is possible (but unlikely) to have explicit memory of events from the operating room while under general anesthesia.   8. ELECTROCONVULSIVE THERAPY PATIENTS: This consent serve below affirms that prior to the time of the procedure, I have explained to the patient and/or his/her guardian, the risks and benefits of undergoing anesthesia, as well as any reasonable alternatives.     ___________________________________________________